# Patient Record
Sex: FEMALE | Race: OTHER | HISPANIC OR LATINO | Employment: OTHER | URBAN - METROPOLITAN AREA
[De-identification: names, ages, dates, MRNs, and addresses within clinical notes are randomized per-mention and may not be internally consistent; named-entity substitution may affect disease eponyms.]

---

## 2019-12-09 ENCOUNTER — OFFICE VISIT (OUTPATIENT)
Dept: OBGYN CLINIC | Facility: CLINIC | Age: 69
End: 2019-12-09

## 2019-12-09 VITALS
HEIGHT: 61 IN | BODY MASS INDEX: 22.47 KG/M2 | HEART RATE: 104 BPM | DIASTOLIC BLOOD PRESSURE: 96 MMHG | WEIGHT: 119 LBS | SYSTOLIC BLOOD PRESSURE: 164 MMHG

## 2019-12-09 DIAGNOSIS — R35.0 URINARY FREQUENCY: ICD-10-CM

## 2019-12-09 DIAGNOSIS — Z12.11 SCREEN FOR COLON CANCER: ICD-10-CM

## 2019-12-09 DIAGNOSIS — Z01.419 ENCOUNTER FOR GYNECOLOGICAL EXAMINATION WITHOUT ABNORMAL FINDING: Primary | ICD-10-CM

## 2019-12-09 DIAGNOSIS — Z12.31 ENCOUNTER FOR SCREENING MAMMOGRAM FOR MALIGNANT NEOPLASM OF BREAST: ICD-10-CM

## 2019-12-09 DIAGNOSIS — Z90.710 HISTORY OF TOTAL ABDOMINAL HYSTERECTOMY: ICD-10-CM

## 2019-12-09 DIAGNOSIS — E28.39 HYPOESTROGENISM: ICD-10-CM

## 2019-12-09 PROCEDURE — 99387 INIT PM E/M NEW PAT 65+ YRS: CPT | Performed by: NURSE PRACTITIONER

## 2019-12-09 PROCEDURE — 87086 URINE CULTURE/COLONY COUNT: CPT | Performed by: NURSE PRACTITIONER

## 2019-12-09 RX ORDER — DIPHENOXYLATE HYDROCHLORIDE AND ATROPINE SULFATE 2.5; .025 MG/1; MG/1
1 TABLET ORAL DAILY
COMMUNITY

## 2019-12-09 RX ORDER — ALBUTEROL SULFATE 90 UG/1
AEROSOL, METERED RESPIRATORY (INHALATION)
Refills: 0 | COMMUNITY
Start: 2019-10-13 | End: 2021-01-04 | Stop reason: ALTCHOICE

## 2019-12-09 NOTE — PROGRESS NOTES
Subjective      Patrecia Landau is a 68 y o  female who presents for annual well woman exam she reports she has not had a GYN exam for at least 20 years  Patient with history of fibroids and had a total abdominal hysterectomy done  Her hysterectomy was 30 years ago  She reports she was on estrogen therapy for about 7 years after her hysterectomy but stopped due to side effects  Her surgery was done [facility]   Denies history of abnormal Pap smears  patient reports urinary frequency that comes and goes  patient reports vaginal itching that comes and goes, no symptoms currently    GYN:  · No vaginal discharge, labial erythema or lesions, dyspareunia  · Contraception:  Tubal ligation  · Patient is not sexually active   Her  passed away last 32 years ago  ·  Last pap smear was in 20 years ago  History of hysterectomy, tubal ligation gynecologic surgeries  She denies any personal cancer history    OB:  · G 2 P 2 female  · Pregnancies were   :  · Dysuria no , sometimes has urinary frequency, has no urgency urgency  The patient denies symptoms today  · no hematuria, flank pain, incontinence  Breast:  · no breast mass, skin changes, dimpling, reddening, nipple retraction  · no breast discharge  · Last mammogram was in 20 years, done at [facility]   Results were negative per pt  · Patient does   have a family history of breast in her sister,  No endometrial, or ovarian ca  Another sister has some type of abdominal cancer     General:  · Diet: none  · Exercise: 3x per week  · Work: works at home  · ETOH use: no  · Tobacco use: no  · Recreational drug use: no    Screening:  · Cervical cancer: last pap smear in  20 years ago  Results were neg per pt  · Breast cancer: last mammogram in 20 years  Results were negative per pt  Colon cancer: last colonoscopy- has never had   Review of Systems  Pertinent items are noted in HPI        Objective      There were no vitals taken for this visit     Physical Exam   Constitutional: She appears well-nourished  Neck: Neck supple  No thyromegaly present  Cardiovascular: Normal rate, regular rhythm and normal heart sounds  Pulmonary/Chest: Effort normal and breath sounds normal    Abdominal: Soft  There is no tenderness  Skin: Skin is warm and dry  Psychiatric: She has a normal mood and affect  Her behavior is normal    appears younger than age       bilateral breast exam- no masses, negative nipple discharge, negative skin changes, no erythema   external genitalia- no erythema no lesions      Vagina- atrophic,  No discharge   cervix- surgically absent, cervical cuff  With no lesions   uterus- surgically absent     -Adnexa- surgically absent   rectal- no masses     Assessment      postmenopausal annual gyn exam   status post total abdominal hysterectomy    Urinary frequency  Plan    request for mammogram given the patient,  Reviewed self-breast exam   request for DEXA scan given the patient- reviewed calcium and vitamin-D and exercise for bone health   patient is never had a colonoscopy -referral given to patient to schedule screening mammogram   will call patient's results to her   clean-catch urine obtained

## 2019-12-10 LAB — BACTERIA UR CULT: NORMAL

## 2020-07-12 ENCOUNTER — APPOINTMENT (EMERGENCY)
Dept: CT IMAGING | Facility: HOSPITAL | Age: 70
End: 2020-07-12
Payer: OTHER GOVERNMENT

## 2020-07-12 ENCOUNTER — HOSPITAL ENCOUNTER (EMERGENCY)
Facility: HOSPITAL | Age: 70
Discharge: HOME | End: 2020-07-12
Attending: EMERGENCY MEDICINE | Admitting: EMERGENCY MEDICINE
Payer: OTHER GOVERNMENT

## 2020-07-12 VITALS
HEART RATE: 87 BPM | HEIGHT: 62 IN | WEIGHT: 118 LBS | BODY MASS INDEX: 21.71 KG/M2 | SYSTOLIC BLOOD PRESSURE: 163 MMHG | OXYGEN SATURATION: 96 % | TEMPERATURE: 98.1 F | DIASTOLIC BLOOD PRESSURE: 89 MMHG | RESPIRATION RATE: 18 BRPM

## 2020-07-12 DIAGNOSIS — R51.9 HEADACHE: Primary | ICD-10-CM

## 2020-07-12 DIAGNOSIS — J01.00 ACUTE NON-RECURRENT MAXILLARY SINUSITIS: ICD-10-CM

## 2020-07-12 DIAGNOSIS — I10 UNCONTROLLED HYPERTENSION: ICD-10-CM

## 2020-07-12 DIAGNOSIS — H65.90 SEROUS OTITIS MEDIA: ICD-10-CM

## 2020-07-12 LAB
ALBUMIN SERPL BCP-MCNC: 4.3 G/DL (ref 3.4–4.8)
ALP SERPL-CCNC: 102 U/L (ref 35–140)
ALT SERPL W P-5'-P-CCNC: 16 U/L (ref 5–54)
ANION GAP SERPL CALCULATED.3IONS-SCNC: 8 MMOL/L (ref 4–13)
APTT PPP: 28 SECONDS (ref 23–31)
AST SERPL W P-5'-P-CCNC: 20 U/L (ref 15–41)
BACTERIA UR QL AUTO: NORMAL /HPF
BASOPHILS # BLD AUTO: 0.03 THOUSANDS/ΜL (ref 0–0.1)
BASOPHILS NFR BLD AUTO: 0 % (ref 0–1)
BILIRUB SERPL-MCNC: 0.68 MG/DL (ref 0.3–1.2)
BILIRUB UR QL STRIP: NEGATIVE
BUN SERPL-MCNC: 19 MG/DL (ref 6–20)
CALCIUM SERPL-MCNC: 9.3 MG/DL (ref 8.4–10.2)
CHLORIDE SERPL-SCNC: 102 MMOL/L (ref 96–108)
CK SERPL-CCNC: 73.1 U/L (ref 38–234)
CLARITY UR: CLEAR
CO2 SERPL-SCNC: 29 MMOL/L (ref 22–33)
COLOR UR: YELLOW
CREAT SERPL-MCNC: 0.81 MG/DL (ref 0.4–1.1)
EOSINOPHIL # BLD AUTO: 0.17 THOUSAND/ΜL (ref 0–0.61)
EOSINOPHIL NFR BLD AUTO: 2 % (ref 0–6)
ERYTHROCYTE [DISTWIDTH] IN BLOOD BY AUTOMATED COUNT: 12.5 % (ref 11.6–15.1)
ERYTHROCYTE [SEDIMENTATION RATE] IN BLOOD: 22 MM/HOUR (ref 0–20)
GFR SERPL CREATININE-BSD FRML MDRD: 73 ML/MIN/1.73SQ M
GLUCOSE SERPL-MCNC: 107 MG/DL (ref 65–140)
GLUCOSE UR STRIP-MCNC: NEGATIVE MG/DL
HCT VFR BLD AUTO: 41.4 % (ref 34.8–46.1)
HGB BLD-MCNC: 14.1 G/DL (ref 11.5–15.4)
HGB UR QL STRIP.AUTO: ABNORMAL
IMM GRANULOCYTES # BLD AUTO: 0.02 THOUSAND/UL (ref 0–0.2)
IMM GRANULOCYTES NFR BLD AUTO: 0 % (ref 0–2)
INR PPP: 0.94 (ref 0.9–1.1)
KETONES UR STRIP-MCNC: NEGATIVE MG/DL
LEUKOCYTE ESTERASE UR QL STRIP: NEGATIVE
LYMPHOCYTES # BLD AUTO: 1.56 THOUSANDS/ΜL (ref 0.6–4.47)
LYMPHOCYTES NFR BLD AUTO: 19 % (ref 14–44)
MAGNESIUM SERPL-MCNC: 2.1 MG/DL (ref 1.6–2.6)
MCH RBC QN AUTO: 29.9 PG (ref 26.8–34.3)
MCHC RBC AUTO-ENTMCNC: 34.1 G/DL (ref 31.4–37.4)
MCV RBC AUTO: 88 FL (ref 82–98)
MONOCYTES # BLD AUTO: 0.59 THOUSAND/ΜL (ref 0.17–1.22)
MONOCYTES NFR BLD AUTO: 7 % (ref 4–12)
NEUTROPHILS # BLD AUTO: 5.67 THOUSANDS/ΜL (ref 1.85–7.62)
NEUTS SEG NFR BLD AUTO: 72 % (ref 43–75)
NITRITE UR QL STRIP: NEGATIVE
NON-SQ EPI CELLS URNS QL MICRO: NORMAL /HPF
PH UR STRIP.AUTO: 7 [PH]
PLATELET # BLD AUTO: 288 THOUSANDS/UL (ref 149–390)
PMV BLD AUTO: 11 FL (ref 8.9–12.7)
POTASSIUM SERPL-SCNC: 3.7 MMOL/L (ref 3.5–5)
PROT SERPL-MCNC: 7.5 G/DL (ref 6.4–8.3)
PROT UR STRIP-MCNC: NEGATIVE MG/DL
PROTHROMBIN TIME: 10 SECONDS (ref 9.5–12.1)
RBC # BLD AUTO: 4.72 MILLION/UL (ref 3.81–5.12)
RBC #/AREA URNS AUTO: NORMAL /HPF
SODIUM SERPL-SCNC: 139 MMOL/L (ref 133–145)
SP GR UR STRIP.AUTO: 1.01 (ref 1–1.03)
TROPONIN I SERPL-MCNC: <0.03 NG/ML (ref 0–0.07)
UROBILINOGEN UR QL STRIP.AUTO: 0.2 E.U./DL
WBC # BLD AUTO: 8.04 THOUSAND/UL (ref 4.31–10.16)
WBC #/AREA URNS AUTO: NORMAL /HPF

## 2020-07-12 PROCEDURE — 85652 RBC SED RATE AUTOMATED: CPT | Performed by: EMERGENCY MEDICINE

## 2020-07-12 PROCEDURE — 83735 ASSAY OF MAGNESIUM: CPT | Performed by: EMERGENCY MEDICINE

## 2020-07-12 PROCEDURE — 80053 COMPREHEN METABOLIC PANEL: CPT

## 2020-07-12 PROCEDURE — 99285 EMERGENCY DEPT VISIT HI MDM: CPT | Performed by: EMERGENCY MEDICINE

## 2020-07-12 PROCEDURE — 85025 COMPLETE CBC W/AUTO DIFF WBC: CPT

## 2020-07-12 PROCEDURE — 70450 CT HEAD/BRAIN W/O DYE: CPT

## 2020-07-12 PROCEDURE — 85730 THROMBOPLASTIN TIME PARTIAL: CPT | Performed by: EMERGENCY MEDICINE

## 2020-07-12 PROCEDURE — 96361 HYDRATE IV INFUSION ADD-ON: CPT

## 2020-07-12 PROCEDURE — 99284 EMERGENCY DEPT VISIT MOD MDM: CPT

## 2020-07-12 PROCEDURE — 36415 COLL VENOUS BLD VENIPUNCTURE: CPT

## 2020-07-12 PROCEDURE — 82550 ASSAY OF CK (CPK): CPT | Performed by: EMERGENCY MEDICINE

## 2020-07-12 PROCEDURE — 84484 ASSAY OF TROPONIN QUANT: CPT

## 2020-07-12 PROCEDURE — 96365 THER/PROPH/DIAG IV INF INIT: CPT

## 2020-07-12 PROCEDURE — 81001 URINALYSIS AUTO W/SCOPE: CPT | Performed by: EMERGENCY MEDICINE

## 2020-07-12 PROCEDURE — 96375 TX/PRO/DX INJ NEW DRUG ADDON: CPT

## 2020-07-12 PROCEDURE — 93005 ELECTROCARDIOGRAM TRACING: CPT

## 2020-07-12 PROCEDURE — 85610 PROTHROMBIN TIME: CPT | Performed by: EMERGENCY MEDICINE

## 2020-07-12 RX ORDER — AMOXICILLIN AND CLAVULANATE POTASSIUM 875; 125 MG/1; MG/1
1 TABLET, FILM COATED ORAL EVERY 12 HOURS
Qty: 20 TABLET | Refills: 0 | Status: SHIPPED | OUTPATIENT
Start: 2020-07-12 | End: 2020-07-22

## 2020-07-12 RX ORDER — DIPHENHYDRAMINE HYDROCHLORIDE 50 MG/ML
25 INJECTION INTRAMUSCULAR; INTRAVENOUS ONCE
Status: COMPLETED | OUTPATIENT
Start: 2020-07-12 | End: 2020-07-12

## 2020-07-12 RX ORDER — FLUTICASONE PROPIONATE 50 MCG
1 SPRAY, SUSPENSION (ML) NASAL DAILY
Qty: 16 G | Refills: 0 | Status: SHIPPED | OUTPATIENT
Start: 2020-07-12 | End: 2020-08-17 | Stop reason: SDUPTHER

## 2020-07-12 RX ORDER — MECLIZINE HYDROCHLORIDE 25 MG/1
25 TABLET ORAL 3 TIMES DAILY PRN
Qty: 30 TABLET | Refills: 0 | Status: SHIPPED | OUTPATIENT
Start: 2020-07-12 | End: 2021-01-04 | Stop reason: ALTCHOICE

## 2020-07-12 RX ORDER — SODIUM CHLORIDE 9 MG/ML
125 INJECTION, SOLUTION INTRAVENOUS CONTINUOUS
Status: DISCONTINUED | OUTPATIENT
Start: 2020-07-12 | End: 2020-07-12 | Stop reason: HOSPADM

## 2020-07-12 RX ORDER — TRAMADOL HYDROCHLORIDE 50 MG/1
50 TABLET ORAL EVERY 6 HOURS PRN
Qty: 20 TABLET | Refills: 0 | Status: SHIPPED | OUTPATIENT
Start: 2020-07-12 | End: 2020-11-24 | Stop reason: ALTCHOICE

## 2020-07-12 RX ORDER — CEFTRIAXONE 1 G/50ML
1000 INJECTION, SOLUTION INTRAVENOUS ONCE
Status: COMPLETED | OUTPATIENT
Start: 2020-07-12 | End: 2020-07-12

## 2020-07-12 RX ORDER — METOCLOPRAMIDE HYDROCHLORIDE 5 MG/ML
10 INJECTION INTRAMUSCULAR; INTRAVENOUS ONCE
Status: COMPLETED | OUTPATIENT
Start: 2020-07-12 | End: 2020-07-12

## 2020-07-12 RX ADMIN — CEFTRIAXONE 1000 MG: 1 INJECTION, SOLUTION INTRAVENOUS at 14:30

## 2020-07-12 RX ADMIN — METOCLOPRAMIDE 10 MG: 5 INJECTION, SOLUTION INTRAMUSCULAR; INTRAVENOUS at 14:26

## 2020-07-12 RX ADMIN — SODIUM CHLORIDE 125 ML/HR: 0.9 INJECTION, SOLUTION INTRAVENOUS at 13:45

## 2020-07-12 RX ADMIN — DIPHENHYDRAMINE HYDROCHLORIDE 25 MG: 50 INJECTION, SOLUTION INTRAMUSCULAR; INTRAVENOUS at 14:25

## 2020-07-12 NOTE — ED PROVIDER NOTES
History  Chief Complaint   Patient presents with    Headache     patient reports intermittent bilateral frontal headache with "spots" to RIGHT side of vision for a few days  reports dizziness with ambulation  hx HTn but does not take any medications  reports mild nausea     This is a 75-year-old female who appears younger than her stated age  Patient ambulated to the emergency department accompanied by her daughter  Patient states that she has had intermittent bilateral frontal headaches with intermittent spots in front of her eyes for several days  She states that she has had dizziness with ambulation for the past 2 days and is worsening  According to daughter she does have a history of hypertension has never been placed on medications  Her daughter states that "the blood pressure was good back to her normal "    Patient denies any any fall striking her head  Denies any vomiting or diarrhea  Denies any fever chills or any recent illnesses  Denies chest pain, tightness or shortness of breath          Prior to Admission Medications   Prescriptions Last Dose Informant Patient Reported? Taking? Calcium Carbonate-Vitamin D (CALCIUM 500/D PO)   Yes No   Sig: Take by mouth   albuterol (PROVENTIL HFA,VENTOLIN HFA) 90 mcg/act inhaler   Yes No   Sig: INHALE 2 PUFFS EVERY 6 HOURS AS NEEDED FOR WHEEZING   multivitamin (THERAGRAN) TABS   Yes No   Sig: Take 1 tablet by mouth daily      Facility-Administered Medications: None       Past Medical History:   Diagnosis Date    Fibroids     Hypertension        Past Surgical History:   Procedure Laterality Date    HYSTERECTOMY      TUBAL LIGATION      UTERINE FIBROID SURGERY         Family History   Problem Relation Age of Onset    Breast cancer Sister     Prostate cancer Brother     Colon cancer Brother     Cancer Sister      I have reviewed and agree with the history as documented      E-Cigarette/Vaping     E-Cigarette/Vaping Substances     Social History Tobacco Use    Smoking status: Never Smoker    Smokeless tobacco: Never Used   Substance Use Topics    Alcohol use: Not Currently    Drug use: Never       Review of Systems   Constitutional: Negative for chills and fever  HENT: Negative for rhinorrhea and sore throat  Eyes: Positive for photophobia  Negative for pain, redness and visual disturbance  Respiratory: Negative for cough and shortness of breath  Cardiovascular: Negative for chest pain and leg swelling  Gastrointestinal: Negative for abdominal pain, diarrhea, nausea and vomiting  Endocrine: Negative for cold intolerance, heat intolerance, polydipsia, polyphagia and polyuria  Genitourinary: Negative for difficulty urinating, dysuria, hematuria and urgency  Musculoskeletal: Negative for back pain and myalgias  Skin: Negative for rash  Neurological: Positive for light-headedness  Negative for dizziness, seizures, syncope, speech difficulty, weakness and headaches  Psychiatric/Behavioral: Negative for confusion  All other systems reviewed and are negative  Physical Exam  Physical Exam   Constitutional: She is oriented to person, place, and time  She appears well-developed and well-nourished  HENT:   Right Ear: Hearing normal  Tympanic membrane is bulging  A middle ear effusion is present  Left Ear: Hearing normal  Tympanic membrane is bulging  A middle ear effusion is present  Nose: Nose normal    Mouth/Throat: Oropharynx is clear and moist  No oropharyngeal exudate  Eyes: Pupils are equal, round, and reactive to light  Conjunctivae and EOM are normal  Right eye exhibits no discharge  Left eye exhibits no discharge  No scleral icterus  Neck: Normal range of motion  Neck supple  No JVD present  No tracheal deviation present  Cardiovascular: Normal rate, regular rhythm and normal heart sounds  No murmur heard  Pulmonary/Chest: Effort normal and breath sounds normal  No respiratory distress  She has no wheezes  She has no rales  Abdominal: Soft  Bowel sounds are normal  There is no tenderness  There is no guarding  Musculoskeletal: Normal range of motion  She exhibits no edema or tenderness  Neurological: She is alert and oriented to person, place, and time  No cranial nerve deficit or sensory deficit  She exhibits normal muscle tone  5/5 motor, nl sens   Skin: Skin is warm and dry  Psychiatric: She has a normal mood and affect  Her behavior is normal    Nursing note and vitals reviewed        Vital Signs  ED Triage Vitals   Temperature Pulse Respirations Blood Pressure SpO2   07/12/20 1309 07/12/20 1309 07/12/20 1309 07/12/20 1309 07/12/20 1309   98 1 °F (36 7 °C) 103 18 (!) 203/98 98 %      Temp Source Heart Rate Source Patient Position - Orthostatic VS BP Location FiO2 (%)   07/12/20 1309 07/12/20 1320 07/12/20 1309 07/12/20 1309 --   Tympanic Monitor Sitting Left arm       Pain Score       --                  Vitals:    07/12/20 1320 07/12/20 1338 07/12/20 1352 07/12/20 1457   BP: (!) 177/95 167/92 149/89 163/89   Pulse: 93 94 94 87   Patient Position - Orthostatic VS: Lying            Visual Acuity  Visual Acuity      Most Recent Value   L Pupil Size (mm)  3   R Pupil Size (mm)  3          ED Medications  Medications   cefTRIAXone (ROCEPHIN) IVPB (premix) 1,000 mg 50 mL (0 mg Intravenous Stopped 7/12/20 1501)   diphenhydrAMINE (BENADRYL) injection 25 mg (25 mg Intravenous Given 7/12/20 1425)   metoclopramide (REGLAN) injection 10 mg (10 mg Intravenous Given 7/12/20 1426)       Diagnostic Studies  Results Reviewed     Procedure Component Value Units Date/Time    Sedimentation rate, automated [[id number]]  (Abnormal) Collected:  07/12/20 1314    Lab Status:  Final result Specimen:  Blood from Arm, Right Updated:  07/12/20 1436     Sed Rate 22 mm/hour     Magnesium [[id number]]  (Normal) Collected:  07/12/20 1314    Lab Status:  Final result Specimen:  Blood from Arm, Right Updated:  07/12/20 1426     Magnesium 2 1 mg/dL     CK Total with Reflex CKMB [[id number]]  (Normal) Collected:  07/12/20 1314    Lab Status:  Final result Specimen:  Blood from Arm, Right Updated:  07/12/20 1426     Total CK 73 1 U/L     Urine Microscopic [[id number]]  (Normal) Collected:  07/12/20 1341    Lab Status:  Final result Specimen:  Urine, Clean Catch Updated:  07/12/20 1402     RBC, UA 0-5 /hpf      WBC, UA None Seen /hpf      Epithelial Cells None Seen /hpf      Bacteria, UA None Seen /hpf     APTT [[id number]]  (Normal) Collected:  07/12/20 1336    Lab Status:  Final result Specimen:  Blood from Arm, Right Updated:  07/12/20 1354     PTT 28 seconds     Protime-INR [[id number]]  (Normal) Collected:  07/12/20 1336    Lab Status:  Final result Specimen:  Blood from Arm, Right Updated:  07/12/20 1354     Protime 10 0 seconds      INR 0 94    Narrative:       INR Reference Ranges:  No Anticoagulant, Normal:           0 9-1 1  Standard Dose, Oral Anticoagulant:  2 0-3 0  High Dose, Oral Anticoagulant:      2 5-3 5    UA w Reflex to Microscopic w Reflex to Culture [[id number]]  (Abnormal) Collected:  07/12/20 1341    Lab Status:  Final result Specimen:  Urine, Clean Catch Updated:  07/12/20 1352     Color, UA Yellow     Clarity, UA Clear     Specific Gravity, UA 1 010     pH, UA 7 0     Leukocytes, UA Negative     Nitrite, UA Negative     Protein, UA Negative mg/dl      Glucose, UA Negative mg/dl      Ketones, UA Negative mg/dl      Urobilinogen, UA 0 2 E U /dl      Bilirubin, UA Negative     Blood, UA Trace-Intact    Comprehensive metabolic panel [[id number]] Collected:  07/12/20 1314    Lab Status:  Final result Specimen:  Blood from Arm, Right Updated:  07/12/20 1348     Sodium 139 mmol/L      Potassium 3 7 mmol/L      Chloride 102 mmol/L      CO2 29 mmol/L      ANION GAP 8 mmol/L      BUN 19 mg/dL      Creatinine 0 81 mg/dL      Glucose 107 mg/dL      Calcium 9 3 mg/dL      AST 20 U/L      ALT 16 U/L      Alkaline Phosphatase 102 0 U/L      Total Protein 7 5 g/dL      Albumin 4 3 g/dL      Total Bilirubin 0 68 mg/dL      eGFR 73 ml/min/1 73sq m     Narrative:       National Kidney Disease Foundation guidelines for Chronic Kidney Disease (CKD):     Stage 1 with normal or high GFR (GFR > 90 mL/min/1 73 square meters)    Stage 2 Mild CKD (GFR = 60-89 mL/min/1 73 square meters)    Stage 3A Moderate CKD (GFR = 45-59 mL/min/1 73 square meters)    Stage 3B Moderate CKD (GFR = 30-44 mL/min/1 73 square meters)    Stage 4 Severe CKD (GFR = 15-29 mL/min/1 73 square meters)    Stage 5 End Stage CKD (GFR <15 mL/min/1 73 square meters)  Note: GFR calculation is accurate only with a steady state creatinine    Troponin I [[id number]]  (Normal) Collected:  07/12/20 1314    Lab Status:  Final result Specimen:  Blood from Arm, Right Updated:  07/12/20 1348     Troponin I <0 03 ng/mL     CBC and differential [[id number]]  (Normal) Collected:  07/12/20 1314    Lab Status:  Final result Specimen:  Blood from Arm, Right Updated:  07/12/20 1323     WBC 8 04 Thousand/uL      RBC 4 72 Million/uL      Hemoglobin 14 1 g/dL      Hematocrit 41 4 %      MCV 88 fL      MCH 29 9 pg      MCHC 34 1 g/dL      RDW 12 5 %      MPV 11 0 fL      Platelets 155 Thousands/uL      Neutrophils Relative 72 %      Immat GRANS % 0 %      Lymphocytes Relative 19 %      Monocytes Relative 7 %      Eosinophils Relative 2 %      Basophils Relative 0 %      Neutrophils Absolute 5 67 Thousands/µL      Immature Grans Absolute 0 02 Thousand/uL      Lymphocytes Absolute 1 56 Thousands/µL      Monocytes Absolute 0 59 Thousand/µL      Eosinophils Absolute 0 17 Thousand/µL      Basophils Absolute 0 03 Thousands/µL                  CT head without contrast   ED Interpretation by Melissa Pickering MD (07/12 1905)   No acute findings      Final Result by Clem Marks MD (07/12 3993)      No acute intracranial abnormality                    Workstation performed: [id number] Procedures  Procedures         ED Course      this 61-year-old female presented with a headache that has been present for several days she also states that she has had some lightheadedness  Patient's blood pressure on arrival was 203/98  Headache is for hyper tension she was placed on medications  Patient's blood it went the 9/89  The medicated patient with Benadryl and right headache patient had bilateral the headache and dizziness  Patient and started on Augmentin for outpatient  And did the proper to obtain back for results    Patient's reviewed and were normal   No EKG changes  Troponin negative    Encouraged patient and her daughter to follow up with the PCP as she should requires medication to keep her blood pressure under control    They verbalize understanding of the significance of this being taking care of                                        MDM  Number of Diagnoses or Management Options  Acute non-recurrent maxillary sinusitis:   Headache: established and improving  Serous otitis media: established and improving  Uncontrolled hypertension: established and improving     Amount and/or Complexity of Data Reviewed  Clinical lab tests: ordered and reviewed  Tests in the radiology section of CPT®: ordered and reviewed  Obtain history from someone other than the patient: yes (daughter)  Review and summarize past medical records: yes  Independent visualization of images, tracings, or specimens: yes    Patient Progress  Patient progress: improved        Disposition  Final diagnoses:   Headache   Uncontrolled hypertension   Serous otitis media   Acute non-recurrent maxillary sinusitis     Time reflects when diagnosis was documented in both MDM as applicable and the Disposition within this note     Time User Action Codes Description Comment    7/12/2020  2:15 PM Latonia Goss Add [R51] Headache     7/12/2020  2:15 PM Latonia Goss Add [I10] Uncontrolled hypertension     7/12/2020  2:16 PM José Miguel Peoples Add [G10 40] Serous otitis media     7/12/2020  2:16 PM José Miguel Peoples Add [J01 00] Acute non-recurrent maxillary sinusitis       ED Disposition     ED Disposition Condition Date/Time Comment    Discharge Stable Sun Jul 12, 2020  2:13 PM Terrence Hernandez discharge to home/self care              Follow-up Information    None         Discharge Medication List as of 7/12/2020  2:53 PM      START taking these medications    Details   amoxicillin-clavulanate (AUGMENTIN) 875-125 mg per tablet Take 1 tablet by mouth every 12 (twelve) hours for 10 days, Starting Sun 7/12/2020, Until Wed 7/22/2020, Normal      fluticasone (FLONASE) 50 mcg/act nasal spray 1 spray into each nostril daily, Starting Sun 7/12/2020, Normal      meclizine (ANTIVERT) 25 mg tablet Take 1 tablet (25 mg total) by mouth 3 (three) times a day as needed for dizziness, Starting Sun 7/12/2020, Normal      traMADol (ULTRAM) 50 mg tablet Take 1 tablet (50 mg total) by mouth every 6 (six) hours as needed for moderate pain for up to 20 doses, Starting Sun 7/12/2020, Normal         CONTINUE these medications which have NOT CHANGED    Details   albuterol (PROVENTIL HFA,VENTOLIN HFA) 90 mcg/act inhaler INHALE 2 PUFFS EVERY 6 HOURS AS NEEDED FOR WHEEZING, Historical Med      Calcium Carbonate-Vitamin D (CALCIUM 500/D PO) Take by mouth, Historical Med      multivitamin (THERAGRAN) TABS Take 1 tablet by mouth daily, Historical Med               PDMP Review     None          ED Provider  Electronically Signed by           Myrna Lemus MD  07/16/20 3667

## 2020-07-12 NOTE — DISCHARGE INSTRUCTIONS
Please follow up with a Family Physician asap - let them know that you were here today and they will be able to look at your chart and your blood pressure  Use the nasal steroid twice daily  (each nostril)

## 2020-07-12 NOTE — ED PROCEDURE NOTE
PROCEDURE  ECG 12 Lead Documentation Only  Date/Time: 7/12/2020 1:17 PM  Performed by: Shira Lui MD  Authorized by:  Shira Lui MD     Indications / Diagnosis:  Hypertensive urgency  ECG reviewed by me, the ED Provider: yes    Patient location:  ED and bedside  Previous ECG:     Previous ECG:  Unavailable    Comparison to cardiac monitor: Yes    Interpretation:     Interpretation: normal    Rate:     ECG rate:  98    ECG rate assessment: normal    Rhythm:     Rhythm: sinus rhythm    Ectopy:     Ectopy: none    QRS:     QRS axis:  Normal  Conduction:     Conduction: normal    ST segments:     ST segments:  Normal  T waves:     T waves: normal    Comments:      No acute ischemia or infarction         Shira Lui MD  07/12/20 1347

## 2020-07-14 LAB
ATRIAL RATE: 98 BPM
P AXIS: 45 DEGREES
PR INTERVAL: 117 MS
QRS AXIS: 65 DEGREES
QRSD INTERVAL: 93 MS
QT INTERVAL: 365 MS
QTC INTERVAL: 467 MS
T WAVE AXIS: 50 DEGREES
VENTRICULAR RATE: 98 BPM

## 2020-07-14 PROCEDURE — 93010 ELECTROCARDIOGRAM REPORT: CPT | Performed by: INTERNAL MEDICINE

## 2020-07-22 ENCOUNTER — OFFICE VISIT (OUTPATIENT)
Dept: FAMILY MEDICINE CLINIC | Facility: CLINIC | Age: 70
End: 2020-07-22
Payer: OTHER GOVERNMENT

## 2020-07-22 VITALS
DIASTOLIC BLOOD PRESSURE: 90 MMHG | SYSTOLIC BLOOD PRESSURE: 160 MMHG | TEMPERATURE: 99.2 F | WEIGHT: 119.2 LBS | BODY MASS INDEX: 21.94 KG/M2 | HEART RATE: 101 BPM | HEIGHT: 62 IN | RESPIRATION RATE: 18 BRPM | OXYGEN SATURATION: 98 %

## 2020-07-22 DIAGNOSIS — I10 UNCONTROLLED HYPERTENSION: Primary | ICD-10-CM

## 2020-07-22 DIAGNOSIS — H65.93 BILATERAL SEROUS OTITIS MEDIA, UNSPECIFIED CHRONICITY: ICD-10-CM

## 2020-07-22 DIAGNOSIS — R70.0 ESR RAISED: ICD-10-CM

## 2020-07-22 PROCEDURE — 1036F TOBACCO NON-USER: CPT | Performed by: FAMILY MEDICINE

## 2020-07-22 PROCEDURE — 99203 OFFICE O/P NEW LOW 30 MIN: CPT | Performed by: FAMILY MEDICINE

## 2020-07-22 PROCEDURE — 1160F RVW MEDS BY RX/DR IN RCRD: CPT | Performed by: FAMILY MEDICINE

## 2020-07-22 PROCEDURE — 3008F BODY MASS INDEX DOCD: CPT | Performed by: FAMILY MEDICINE

## 2020-07-22 RX ORDER — LISINOPRIL 20 MG/1
20 TABLET ORAL DAILY
Qty: 1 TABLET | Refills: 1 | Status: SHIPPED | OUTPATIENT
Start: 2020-07-22 | End: 2020-08-03 | Stop reason: ALTCHOICE

## 2020-07-22 NOTE — ASSESSMENT & PLAN NOTE
Assessment:  The patient appears to have uncontrolled hypertension as her primary health issue today  Plan: The patient was prescribed lisinopril 20 mg q d and will follow-up in 7 days or beforehand for any new or additional symptoms

## 2020-07-22 NOTE — PROGRESS NOTES
Assessment/Plan:    Uncontrolled hypertension  Assessment:  The patient appears to have uncontrolled hypertension as her primary health issue today  Plan: The patient was prescribed lisinopril 20 mg q d and will follow-up in 7 days or beforehand for any new or additional symptoms  Serous otitis media  Assessment:  Serous otitis media  Plan: Will reevaluate on her next office visit  Diagnoses and all orders for this visit:    Uncontrolled hypertension    Bilateral serous otitis media, unspecified chronicity        Subjective:      Patient ID: Shoaib Cannon is a 65 y o  female  Patient presents with:  Establish Care: patient c/o over heating frequently        Hypertension   Associated symptoms include headaches  Pertinent negatives include no chest pain or shortness of breath  José Luis Jacob is a very pleasant 49-year-old female who presents today following a recent emergency room admission for headaches and lightheadedness  A detailed history today was provided with the assistance of remote interpretation and confirmed twice indicating that the patient's headaches have resolved, and she is not experiencing any of the related symptoms she experienced on her most recent admission to the emergency room  Specifically, she denies headache, dizziness loss of balance, and  ringing in the ears  She was advised by emergency room personnel to obtain a primary care visit hence the primary reason for her presentation today    Review of Systems   Constitutional: Positive for diaphoresis  Negative for appetite change, chills and fever  HENT: Negative for facial swelling and hearing loss  Respiratory: Negative for apnea, chest tightness and shortness of breath  Cardiovascular: Negative for chest pain  Gastrointestinal: Positive for abdominal pain  Endocrine: Positive for heat intolerance  Skin: Positive for color change and rash  Neurological: Positive for dizziness, syncope and headaches  Psychiatric/Behavioral: Positive for agitation, behavioral problems and confusion  Describes periods of sadness "depression", much more so during menopause, as best as can be determined self resolving  Objective:      /90 (BP Location: Right arm, Patient Position: Sitting, Cuff Size: Adult)   Pulse 101   Temp 99 2 °F (37 3 °C) (Tympanic)   Resp 18   Ht 5' 2" (1 575 m)   Wt 54 1 kg (119 lb 3 2 oz)   SpO2 98%   BMI 21 80 kg/m²          Physical Exam   Constitutional: She is oriented to person, place, and time  She appears well-developed and well-nourished  HENT:   Head: Normocephalic and atraumatic  Head is without right periorbital erythema and without left periorbital erythema  Serous OM, much worse on the left  There is no tenderness of the temporal facial areas no or nor other any so masses or soft tissue changes palpable or visible  Eyes: EOM are normal    Cardiovascular: Normal rate and regular rhythm  Murmur heard  Pulmonary/Chest: Breath sounds normal  She is in respiratory distress  Abdominal: Soft  Bowel sounds are normal    Neurological: She is alert and oriented to person, place, and time  Skin: Skin is dry  Psychiatric: She has a normal mood and affect   Her behavior is normal  Judgment normal

## 2020-08-03 ENCOUNTER — OFFICE VISIT (OUTPATIENT)
Dept: FAMILY MEDICINE CLINIC | Facility: CLINIC | Age: 70
End: 2020-08-03
Payer: OTHER GOVERNMENT

## 2020-08-03 VITALS
HEART RATE: 94 BPM | HEIGHT: 62 IN | DIASTOLIC BLOOD PRESSURE: 80 MMHG | RESPIRATION RATE: 18 BRPM | OXYGEN SATURATION: 99 % | WEIGHT: 118 LBS | BODY MASS INDEX: 21.71 KG/M2 | SYSTOLIC BLOOD PRESSURE: 130 MMHG | TEMPERATURE: 98.7 F

## 2020-08-03 DIAGNOSIS — J30.2 SEASONAL ALLERGIC RHINITIS, UNSPECIFIED TRIGGER: ICD-10-CM

## 2020-08-03 DIAGNOSIS — I10 HYPERTENSION, UNSPECIFIED TYPE: Primary | ICD-10-CM

## 2020-08-03 DIAGNOSIS — K21.9 GASTROESOPHAGEAL REFLUX DISEASE, ESOPHAGITIS PRESENCE NOT SPECIFIED: ICD-10-CM

## 2020-08-03 PROCEDURE — 1160F RVW MEDS BY RX/DR IN RCRD: CPT | Performed by: FAMILY MEDICINE

## 2020-08-03 PROCEDURE — 3008F BODY MASS INDEX DOCD: CPT | Performed by: FAMILY MEDICINE

## 2020-08-03 PROCEDURE — 99214 OFFICE O/P EST MOD 30 MIN: CPT | Performed by: FAMILY MEDICINE

## 2020-08-03 PROCEDURE — 1036F TOBACCO NON-USER: CPT | Performed by: FAMILY MEDICINE

## 2020-08-03 RX ORDER — OMEPRAZOLE 20 MG/1
20 CAPSULE, DELAYED RELEASE ORAL DAILY
Qty: 60 CAPSULE | Refills: 0 | Status: SHIPPED | OUTPATIENT
Start: 2020-08-03

## 2020-08-03 RX ORDER — AMLODIPINE BESYLATE 2.5 MG/1
2.5 TABLET ORAL DAILY
Qty: 90 TABLET | Refills: 0 | Status: SHIPPED | OUTPATIENT
Start: 2020-08-03 | End: 2020-11-12 | Stop reason: SDUPTHER

## 2020-08-03 NOTE — ASSESSMENT & PLAN NOTE
-Patient having GERD symptoms, worse at night  -Patient previously tried tiered treatment of GERD symptoms:   -OTC antacid (TUMS, Rolaids, Maalox)   -OTC H2 blocker (ranitidine, famotidine), used BID  -Discussed with patient her indications for PPI therapy:   -Severe symptoms of GERD that impair quality of life  -Accordingly, will initiate 8-week trial of PPI therapy at this time  -Advised on taking PPI 30-60 mins before breakfast for maximal absorption  -Encouraged QD administration (not PRN) for better symptom control  -Discussed Anti-reflux measures:    -Raise the head of the bed 4 to 6 inches   -Avoid excess coffee, tea or other caffeinated beverages   -Avoid spicy or acidic foods, eating before bed   -Avoid garments that fit tightly through the abdomen

## 2020-08-03 NOTE — PROGRESS NOTES
Assessment/Plan:     Problem List Items Addressed This Visit        Digestive    Gastroesophageal reflux disease     -Patient having GERD symptoms, worse at night  -Patient previously tried tiered treatment of GERD symptoms:   -OTC antacid (TUMS, Rolaids, Maalox)   -OTC H2 blocker (ranitidine, famotidine), used BID  -Discussed with patient her indications for PPI therapy:   -Severe symptoms of GERD that impair quality of life  -Accordingly, will initiate 8-week trial of PPI therapy at this time  -Advised on taking PPI 30-60 mins before breakfast for maximal absorption  -Encouraged QD administration (not PRN) for better symptom control  -Discussed Anti-reflux measures:    -Raise the head of the bed 4 to 6 inches   -Avoid excess coffee, tea or other caffeinated beverages   -Avoid spicy or acidic foods, eating before bed   -Avoid garments that fit tightly through the abdomen           Relevant Medications    omeprazole (PriLOSEC) 20 mg delayed release capsule       Respiratory    Seasonal allergic rhinitis     -Sterile effusions behind B/L TMs, no evidence of infection at this time  -Suspect patient having some post-nasal drip contributing to throat symptoms  -Advised patient to start flonase and claritin daily to see if congestion symptoms improve            Cardiovascular and Mediastinum    Hypertension - Primary     -HTN now well-controlled and at goal  -Unfortunately, patient reports intolerance of ACEI with frequent throat clearing and tickle sensation since starting the medication  -Difficult to determine whether throat symptoms are truly related to ACEI, or just GERD, post-nasal drip, etc   -Per patient request, will change ACEI to CCB at this time  -Due to patient's age, will start low-dose amlodipine 2 5 mg QD and have patient return in 2 weeks, will likely need dose increase at that time  -Record home BP readings and bring log book to next visit for review  -DASH diet handout given today  -Advised patient on symptoms of hypotension & severe HTN  -Limit salt-intake & caffeine in diet, minimize stress level  -Weight reduction efforts via improved diet & increased exercise             Relevant Medications    amLODIPine (NORVASC) 2 5 mg tablet            Subjective:      Patient ID: Dina Fulton is a 65 y o  female  HPI    Patient seen 7/22/20 for HTN and started on lisinopril 20 mg QD at that visit  She has been checking her BP at home and it has been consistently <140/90, but she is complaining now of a dry cough and tickling in her throat sensation that is very bothersome to her  She would like to try a different medication for her HTN  Also found to have serous otitis media B/L at that visit; was given abx in ED 7/12/20  Uses claritin and flonase but only sporadically  Does have some acid reflux, especially when drinking citrus juices and coffee; uses Tums but it doesn't help much  Not on any PPI  The following portions of the patient's history were reviewed and updated as appropriate: allergies, current medications, past family history, past medical history, past social history, past surgical history and problem list     Review of Systems   Constitutional: Negative for chills and fever  HENT: Positive for congestion and postnasal drip  Negative for trouble swallowing  "Tickle in throat" as noted in HPI   Respiratory: Negative for chest tightness and shortness of breath  Cardiovascular: Negative for chest pain, palpitations and leg swelling  Gastrointestinal: Negative for abdominal pain, diarrhea, nausea and vomiting  Genitourinary: Negative for dysuria  Musculoskeletal: Negative for gait problem  Skin: Negative for rash  Neurological: Positive for dizziness  Negative for syncope, weakness and light-headedness  Still having intermittent dizziness when moving around suddenly   Psychiatric/Behavioral: Negative for agitation, sleep disturbance and suicidal ideas     All other systems reviewed and are negative  Objective:      /80 (BP Location: Left arm, Patient Position: Sitting, Cuff Size: Adult)   Pulse 94   Temp 98 7 °F (37 1 °C) (Tympanic)   Resp 18   Ht 5' 2"   Wt 53 5 kg (118 lb)   SpO2 99%   BMI 21 58 kg/m²          Physical Exam   Constitutional:   Clear-colored effusions behind B/L TMs   HENT:   Head: Normocephalic and atraumatic  Right Ear: External ear normal    Left Ear: External ear normal    Nose: Nose normal    Mouth/Throat: Mucous membranes are moist    Eyes: Conjunctivae are normal    Neck: Normal range of motion  Neck supple  Cardiovascular: Normal rate, regular rhythm and normal pulses  Pulmonary/Chest: Effort normal and breath sounds normal    Abdominal: Soft  Normal appearance and bowel sounds are normal  She exhibits no distension  There is no abdominal tenderness  Musculoskeletal: Normal range of motion  Neurological: She is alert  Skin: Skin is warm and dry  Capillary refill takes less than 2 seconds  Psychiatric: Her behavior is normal  Mood normal    Vitals reviewed

## 2020-08-03 NOTE — ASSESSMENT & PLAN NOTE
-Sterile effusions behind B/L TMs, no evidence of infection at this time  -Suspect patient having some post-nasal drip contributing to throat symptoms  -Advised patient to start flonase and claritin daily to see if congestion symptoms improve

## 2020-08-03 NOTE — ASSESSMENT & PLAN NOTE
-HTN now well-controlled and at goal  -Unfortunately, patient reports intolerance of ACEI with frequent throat clearing and tickle sensation since starting the medication  -Difficult to determine whether throat symptoms are truly related to ACEI, or just GERD, post-nasal drip, etc   -Per patient request, will change ACEI to CCB at this time  -Due to patient's age, will start low-dose amlodipine 2 5 mg QD and have patient return in 2 weeks, will likely need dose increase at that time  -Record home BP readings and bring log book to next visit for review  -DASH diet handout given today  -Advised patient on symptoms of hypotension & severe HTN  -Limit salt-intake & caffeine in diet, minimize stress level  -Weight reduction efforts via improved diet & increased exercise

## 2020-08-17 ENCOUNTER — OFFICE VISIT (OUTPATIENT)
Dept: FAMILY MEDICINE CLINIC | Facility: CLINIC | Age: 70
End: 2020-08-17
Payer: OTHER GOVERNMENT

## 2020-08-17 VITALS
HEART RATE: 102 BPM | TEMPERATURE: 98.2 F | SYSTOLIC BLOOD PRESSURE: 128 MMHG | RESPIRATION RATE: 18 BRPM | HEIGHT: 62 IN | OXYGEN SATURATION: 97 % | DIASTOLIC BLOOD PRESSURE: 80 MMHG | BODY MASS INDEX: 21.64 KG/M2 | WEIGHT: 117.6 LBS

## 2020-08-17 DIAGNOSIS — I10 ESSENTIAL HYPERTENSION: Primary | ICD-10-CM

## 2020-08-17 DIAGNOSIS — J30.2 SEASONAL ALLERGIC RHINITIS, UNSPECIFIED TRIGGER: ICD-10-CM

## 2020-08-17 PROBLEM — J30.9 ALLERGIC RHINITIS: Status: ACTIVE | Noted: 2020-07-12

## 2020-08-17 PROCEDURE — 99213 OFFICE O/P EST LOW 20 MIN: CPT | Performed by: FAMILY MEDICINE

## 2020-08-17 PROCEDURE — 1160F RVW MEDS BY RX/DR IN RCRD: CPT | Performed by: FAMILY MEDICINE

## 2020-08-17 PROCEDURE — 1036F TOBACCO NON-USER: CPT | Performed by: FAMILY MEDICINE

## 2020-08-17 PROCEDURE — 3008F BODY MASS INDEX DOCD: CPT | Performed by: FAMILY MEDICINE

## 2020-08-17 RX ORDER — FLUTICASONE PROPIONATE 50 MCG
1 SPRAY, SUSPENSION (ML) NASAL DAILY
Qty: 16 G | Refills: 3 | Status: SHIPPED | OUTPATIENT
Start: 2020-08-17 | End: 2020-11-23 | Stop reason: SDUPTHER

## 2020-08-17 NOTE — PROGRESS NOTES
Family Medicine Follow-Up Office Visit  Avila Diego 65 y o  female   MRN: [id number] : 1950  ENCOUNTER: 2020 9:28 AM    Assessment and Plan     Problem List Items Addressed This Visit        Respiratory    Allergic rhinitis     -Symptoms of post-nasal drip improving with flonase and claritin  -Serous otitis improved but still some clear effusions behind TMs  -Continue current regimen, reassess at next visit         Relevant Medications    fluticasone (FLONASE) 50 mcg/act nasal spray       Cardiovascular and Mediastinum    Hypertension - Primary     -HTN very well-controlled on amlodipine 2 5 mg after patient unable to tolerate ACEI  -Denies side effects, no pedal edema on exam  -BP log revealed BP 110s-130s/70s-80s, today's /80, at goal  -Continue current medication, no refill needed today  -Discussed patient's BP goal in detail with her today  -Advised patient on symptoms of hypotension & severe HTN  -Limit salt-intake & caffeine in diet, minimize stress level  -DASH diet handout given via AVS (in Sami)   -Discussed importance of treating HTN to prevent ASCVD, CVA, CKD/ESRD  -Follow up in 3 months for re-check, refill of amlodipine  -Will check fasting CMP, lipid panel, TSH at next visit                 Chief Complaint     Chief Complaint   Patient presents with    Follow-up       History of Present Illness   Avila Diego is a 65y o -year-old female who presents today for follow up on HTN; BP was well-controlled at last visit 8/3/20 but she was unable to tolerated ACEI due to cough and patient requested to try a different type of medication; was started on amlodipine 2 5 mg QD and told to monitor and record BP for review at today's visit  She has brought her BP log (shown below), which shows her BP has been controlled very well, 110s-130s/70s-80s  BP today 128/80      Also started on Prilosec for suspected GERD, and flonase/claritin for post-nasal drip, as both GERD & post-nasal drip may have been contributing to cough/tickle-in-throat sensation  Feels both medication regimens have been helping  Review of Systems   Review of Systems   Constitutional: Negative for chills and fever  HENT: Positive for postnasal drip  Eyes: Negative for visual disturbance  Respiratory: Negative for chest tightness and shortness of breath  Cardiovascular: Negative for chest pain, palpitations and leg swelling  Gastrointestinal: Negative for abdominal pain, diarrhea, nausea and vomiting  Genitourinary: Negative for dysuria  Musculoskeletal:        Denies LE edema   Neurological: Negative for syncope, weakness and light-headedness  All other systems reviewed and are negative  Active Problem List     Patient Active Problem List   Diagnosis    Urinary frequency    Hypoestrogenism    Screen for colon cancer    Encounter for screening mammogram for malignant neoplasm of breast    History of total abdominal hysterectomy    Encounter for gynecological examination without abnormal finding    Headache    Hypertension    Serous otitis media    Acute non-recurrent maxillary sinusitis    ESR raised    Gastroesophageal reflux disease    Seasonal allergic rhinitis       Past Medical History, Past Surgical History, Family History, and Social History were reviewed and updated today as appropriate  Objective   /80 (BP Location: Right arm, Patient Position: Sitting, Cuff Size: Adult)   Pulse 102   Temp 98 2 °F (36 8 °C) (Tympanic)   Resp 18   Ht 5' 2" (1 575 m)   Wt 53 3 kg (117 lb 9 6 oz)   SpO2 97%   BMI 21 51 kg/m²     Physical Exam  Vitals signs reviewed  Constitutional:       General: She is not in acute distress  Appearance: Normal appearance  HENT:      Head: Normocephalic and atraumatic  Ears:      Comments: Very mild clear effusions B/L, L>R  Eyes:      General:         Right eye: No discharge  Left eye: No discharge     Neck:      Musculoskeletal: Normal range of motion  Cardiovascular:      Rate and Rhythm: Normal rate and regular rhythm  Pulmonary:      Effort: Pulmonary effort is normal       Breath sounds: Normal breath sounds  Abdominal:      General: Abdomen is flat  Bowel sounds are normal       Palpations: Abdomen is soft  Tenderness: There is no abdominal tenderness  Musculoskeletal: Normal range of motion  Skin:     Capillary Refill: Capillary refill takes less than 2 seconds  Neurological:      Mental Status: She is alert  Psychiatric:         Mood and Affect: Mood normal          Behavior: Behavior normal          Thought Content: Thought content normal          Judgment: Judgment normal          Current Medications     Current Outpatient Medications   Medication Sig Dispense Refill    albuterol (PROVENTIL HFA,VENTOLIN HFA) 90 mcg/act inhaler INHALE 2 PUFFS EVERY 6 HOURS AS NEEDED FOR WHEEZING  0    amLODIPine (NORVASC) 2 5 mg tablet Take 1 tablet (2 5 mg total) by mouth daily 90 tablet 0    Calcium Carbonate-Vitamin D (CALCIUM 500/D PO) Take by mouth      fluticasone (FLONASE) 50 mcg/act nasal spray 1 spray into each nostril daily 16 g 0    meclizine (ANTIVERT) 25 mg tablet Take 1 tablet (25 mg total) by mouth 3 (three) times a day as needed for dizziness 30 tablet 0    multivitamin (THERAGRAN) TABS Take 1 tablet by mouth daily      omeprazole (PriLOSEC) 20 mg delayed release capsule Take 1 capsule (20 mg total) by mouth daily 60 capsule 0    traMADol (ULTRAM) 50 mg tablet Take 1 tablet (50 mg total) by mouth every 6 (six) hours as needed for moderate pain for up to 20 doses 20 tablet 0     No current facility-administered medications for this visit          ALLERGIES:  No Known Allergies      Ryne Miles MD   [address]  8/17/2020  1:23 PM

## 2020-08-17 NOTE — PATIENT INSTRUCTIONS
Plan de alimentación con "enfoque dietético para detener la hipertensión (DASH, por jimmy siglas en inglés)   CUIDADO AMBULATORIO:   El plan de alimentación DASH (enfoques dietéticos para detener la hipertensión)  está diseñado para ayudar a prevenir o disminuir la presión arterial fan  También puede ayudar a bajar el colesterol samuel (colesterol LDL) y disminuír tucker riesgo de enfermedad cardíaca  El plan es bajo en sodio, azúcar, grasas dañinas, y grasas en tucker totalidad  Es alto en potasio, calcio, magnesio y [place]  Estos nutrientes se agregan al consumir más frutas, vegetales y granos enteros  Tucker límite de sodio cada día: Tucker dietista le indicará la cantidad de sodio que usted debe consumir a diario  La gente que tiene la presión arterial fan debe consumir de 1,500 a 2,300 mg de sodio al día pelon kaylee  Law cucharadita (cdta) de sal tiene 2,300 mg de sodio  [place] puede parecer pelon law meta difícil, alf pequeños cambios en los alimentos que usted consume pueden hacer law gran diferencia  Tucker médico o dietista puede ayudarlo a crear un plan alimenticio que cumpla tucker límite de sodio  Formas de limitar el sodio:   · Neli las etiquetas de los alimentos  Las etiquetas pueden ayudarle a escoger alimentos bajos en sodio  La cantidad de sodio está incluida en miligramos (mg)  La columna del porcentaje de valor diario indica la cantidad de necesidades diarias satisfechas con 1 porción del alimento para cada nutriente en la lista  Escoja alimentos que tengan menos de 5% del porcentaje diario de [place]  Estos alimentos se consideran bajos en sodio  Los alimentos que tienen 20% o más del porcentaje diario de sodio se consideran alimentos altos en sodio  Evite alimentos que tengan más de 300 mg de sodio por porción  Escoja alimentos Kadie Panda diga que son bajos en sodio, con sodio reducido, o sin sal agregada             · Evite la sal   No le agregue sal a la comida cuando se siente a la ashley a comer, y agregue muy poca sal a los alimentos alonzo la cocción  Use hierbas y condimentos, pelon cebollas, ajo y especias sin sal para agregar sabor a los alimentos  Use jugo de lima, georges o vinagre para darle a los alimentos un sabor ácido  Use chiles picantes o law cantidad pequeña de salsa picante para agregar un sabor picante a los alimentos  · Pregunte sobre los sustitutos para la sal   Pregúntele a tucker médico si es posible usar sustitutos de la sal  Algunos sustitutos de la sal vienen con ingredientes que pueden ser dañinos si usted tiene ciertos padecimientos médicos  · Escoja los alimentos cuidadosamente cuando sale a comer a restaurantes:  las comidas de los restaurantes, sobre todo restaurantes de comida rápida, merly siempre son altas en sodio  Algunos restaurantes ofrecen información nutricional que indica la cantidad de sodio en jimmy alimentos  Pida que preparen jimmy comidas con menos sal o sin sal   Lo que necesita saber acerca de las grasas:   · Incluya grasas saludables  Las grasas insaturadas y los ácidos grasos omega-3 son ejemplos de grasas saludables  Las grasas insaturadas se encuentran en los aceites de soja, [place], [place] o [place] y la margarina [facility] y [facility]  Los ácidos grasos [place] 3 se encuentran en el pescado con grasa, pelon el salmón, el atún, la caballa y las darren  También se le puede encontrar en el aceita de linaza y en la linaza molida  · Evite las grasas insalubres  No consuma grasas dañinas, pelon grasas saturadas y grasas trans  Las grasas saturadas se encuentran en alimentos que contienen grasa animal  Maurita Chante son [facility] grasosas, la [place], la [place], la crema y otros productos lácteos  Además se pueden encontrar en la [place], la margarina en vandana, el aceite de syed y el aceite de rian  Las grasas trans se encuentran en comidas fritas, galletas estilo soda, tortillitas tostadas, y alimentos horneados hechos con Darrel Salmon    Alimentos que puede incluir:  Con el plan de alimentación DASH, usted necesita consumir un número específico de porciones de cada sudheer de alimentos  [place] le ayudará a consumir las cantidades suficientes de ciertos nutrientes y limitar otros  La cantidad de porciones que usted debe comer depende de la cantidad de calorías que usted necesita  [place] dietista le informará sobre cuántas calorías necesita usted  El número de porciones que viene en la lista al lado de los grupos alimenticios a continuación es para las personas que necesitan aproximadamente 2,000 calorías al día    · Granos:  6 a 8 porciones (3 de estas porciones deben ser de alimentos de granos enteros o integrales)    ¨ 1 tajada de pan integral     ¨ 1 onza de cereal seco    ¨ ½ taza de cereal cocinado, pasta, o arroz integral    · Verduras y frutas:  4 a 5 porciones de frutas y 4 a 5 porciones de vegetales    ¨ 1 fruta mediana    ¨ ½ taza de vegetales congelados, enlatados (sin sal adicional), o vegetales frescos y picados     ¨ ½ taza de fruta fresca, congelada, seca o enlatada (enlatada en sirope liviano o jugo de fruta)    ¨ ½ taza de jugo de verduras o frutas    · Productos lácteos:  2 a 3 porciones    ¨ 1 taza de [employer] o leche 1%    ¨ 1½ onzas de queso descremado o bajo en grasas y bajo en sodio    ¨ 6 onzas de yogurt descremado o bajo en grasas    · Alberto morejon, alberto ta y pescado magros:  6 onzas o menos    ¨ Alberto ta (carmella, pavo) sin piel    ¨ Pescado (sobre todo pescado con grasa, pelon salmón, atún fresco o SIDDIQUI)    ¨ Carne de res o de cerdo magra (lomo, carne molida extra [place])    ¨ Claras de huevo y sustitutos del huevo    · [place] du sac, semillas y legumbres:  4 a 5 porciones por semana    ¨ ½ taza de frijoles y arbejas cocinadas    ¨ 1½ onzas de nueces sin sal    ¨ 2 cucharadas de mantequilla de maní o semillas    · Dulces y azúcares adicionales:  5 o menos por semana    ¨ 1 cucharada de azúcar, jalea o mermelada    ¨ ½ taza de sorbeto o gelatina    ¨ 1 taza de limonada    · Grasas:  2 a 3 porciones por semana    ¨ 1 cucharadita de margarina suave o aceite vegetal    ¨ 1 cucharada de [facility]    ¨ 2 cucharadas de aderezo para ensaladas  Alimentos que se deben evitar:   · Granos:      ¨ Postres horneados o fritos pelon donas, pastelitos, galletas, y quequitos (altos en grasas y azúcar)    ¨ Mezclas para hacer pan de maíz y pasteles, alimentos empacados, pelon rellenos para pavo, mezclas para hacer arroz y pasta, macarrones con [place]-barre, y cereales instantáneos (altos en sodio)    · Frutas y verduras:      ¨ Vegetales regulares enlatados (altos en sodio)    ¨ Repollo preparado en vinagre, vegetales en vinagre, y otros alimentos preparados en escabeche (altos en sodio)    ¨ Vegetales fritos o vegetales en mantequilla o salsas altas en grasas    ¨ Frutas en crema o salsa de mantequilla (altas en grasas)    · Productos lácteos:      ¨ Leche entera, leche semi descremada (2%), y crema (fan en grasas)    ¨ Queso regular y queso procesado (alto en grasa y sodio)    · Alberto y alimentos con proteínas:      ¨ Alberto ahumadas o curadas, pelon carne de candy en conserva, tocino, jamón, perros calientes, y salchicha (fan en grasas y sodio)    ¨ Frijoles enlatados y alberto enlatadas o alberto en pasta, pelon alberto en conserva, darren, anchoas y [address] de imitación (altos en sodio)    ¨ Alberto para emparedados, pelon [place], [place], Arjun, y carne de res en rebanada (altos en sodio)    ¨ Alberto altas en grasas (bistec estilo [place], carne molida para hamburguesas, costillas)    ¨ Huevos enteros y yemas de huevo (altos en grasas)    · Otros:      ¨ Condimentos hechos con sal, pelon sal de ajo, sal de apio, sal de cebolla, sal condimentada, suavizantes para alberto, y glutamato de monosodio (MSG, por jimmy siglas en inglés)    ¨ Sopa Miso y mezclas para sopa enlatadas o secas (altas en sodio)    ¨ Salsa de soya regular, salsa de [place], salsa [place], salsa para bistec, [place] [employer], y [address] [place] [address] Ave  vinagres con sabor (altas en sodio)    ¨ Condimentos regulares, pelon [place], salsa de tomate y aderezos para ensalada (altos en sodio)    ¨ Salsa para dominic y salsas en general, pelon salsa [place] o de [place]-barre (altas en sodio y [place])    ¨ Bebidas altas en azúcar, pelon bebidas gaseosas o jugos de frutas    ¨ Alimentos para [address], pelon epi tostadas, palomitas de [place], pretzels, piel de cerdo, [address] de soda [facility], y nueces saladas    ¨ Alimentos congelados, pelon cenas preparadas, platos principales, vegetales con salsas, y dominic cubiertas en pan (altas en sodio)  Otras pautas que debe seguir:   · Mantenga un peso saludable  Tucker riesgo de enfermedad cardíaca es aún más alto si usted tiene sobrepeso  Tucker médico podría sugerirle que adelgace si tiene sobrepeso  Usted puede perder peso si se propone consumir menos calorías y alimentos que tengan azúcar y grasas agregadas  El plan de alimentación DASH puede ayudarle a lograrlo  Marianne buena forma de disminuir el consumo de calorías es consumiendo porciones más pequeñas en cada comida y menos meriendas entre comidas  Consulte a tucker médico para obtener más información sobre cómo adelgazar  · Realice actividad física con regularidad  El ejercicio regular puede ayudarle a alcanzar o mantener un peso saludable  El ejercicio regular también puede ayudarle a disminuir tucker presión arterial y mejorar jimmy niveles de colesterol  Iris ejercicios moderados por 30 minutos o más todos los días de la [place]  Para bajar peso, asegúrese de ejercitarse por lo menos 60 minutos  Consulte con tucker médico sobre un programa de ejercicio adecuado para usted  · Limite el consumo de alcohol  Las mujeres deberían limitar el consumo de alcohol a 1 bebida por día  Los hombres deberían limitar el consumo de alcohol a 2 tragos al día  Un trago equivale a 12 onzas de cerveza, 5 onzas de vino o 1 onza y ½ de licor    © 2017 [address] Information is for End User's use only and may not be sold, redistributed or otherwise used for commercial purposes  All illustrations and images included in CareNotes® are the copyrighted property of A D A M , Inc  or Geronimo Timmons  Esta información es sólo para uso en educación  Tucker intención no es darle un consejo médico sobre enfermedades o tratamientos  Colsulte con tucker Anastacio [place] farmacéutico antes de seguir cualquier régimen médico para saber si es seguro y efectivo para usted

## 2020-08-17 NOTE — ASSESSMENT & PLAN NOTE
-HTN very well-controlled on amlodipine 2 5 mg after patient unable to tolerate ACEI  -Denies side effects, no pedal edema on exam  -BP log revealed BP 110s-130s/70s-80s, today's /80, at goal  -Continue current medication, no refill needed today  -Discussed patient's BP goal in detail with her today  -Advised patient on symptoms of hypotension & severe HTN  -Limit salt-intake & caffeine in diet, minimize stress level  -DASH diet handout given via AVS (in Fijian)   -Discussed importance of treating HTN to prevent ASCVD, CVA, CKD/ESRD  -Follow up in 3 months for re-check, refill of amlodipine  -Will check fasting CMP, lipid panel, TSH at next visit

## 2020-08-17 NOTE — ASSESSMENT & PLAN NOTE
-Symptoms of post-nasal drip improving with flonase and claritin  -Serous otitis improved but still some clear effusions behind TMs  -Continue current regimen, reassess at next visit

## 2020-11-12 DIAGNOSIS — I10 HYPERTENSION, UNSPECIFIED TYPE: ICD-10-CM

## 2020-11-12 RX ORDER — AMLODIPINE BESYLATE 2.5 MG/1
2.5 TABLET ORAL DAILY
Qty: 90 TABLET | Refills: 0 | Status: SHIPPED | OUTPATIENT
Start: 2020-11-12 | End: 2021-02-08 | Stop reason: SDUPTHER

## 2020-11-23 ENCOUNTER — OFFICE VISIT (OUTPATIENT)
Dept: FAMILY MEDICINE CLINIC | Facility: CLINIC | Age: 70
End: 2020-11-23
Payer: OTHER GOVERNMENT

## 2020-11-23 VITALS
BODY MASS INDEX: 21.55 KG/M2 | DIASTOLIC BLOOD PRESSURE: 88 MMHG | OXYGEN SATURATION: 99 % | HEART RATE: 104 BPM | TEMPERATURE: 96.5 F | RESPIRATION RATE: 18 BRPM | HEIGHT: 62 IN | WEIGHT: 117.13 LBS | SYSTOLIC BLOOD PRESSURE: 130 MMHG

## 2020-11-23 DIAGNOSIS — R07.89 CHEST PRESSURE: ICD-10-CM

## 2020-11-23 DIAGNOSIS — J30.2 SEASONAL ALLERGIC RHINITIS, UNSPECIFIED TRIGGER: ICD-10-CM

## 2020-11-23 DIAGNOSIS — I10 ESSENTIAL HYPERTENSION: Primary | ICD-10-CM

## 2020-11-23 PROCEDURE — 99214 OFFICE O/P EST MOD 30 MIN: CPT | Performed by: FAMILY MEDICINE

## 2020-11-23 RX ORDER — FLUTICASONE PROPIONATE 50 MCG
1 SPRAY, SUSPENSION (ML) NASAL DAILY
Qty: 16 G | Refills: 3 | Status: SHIPPED | OUTPATIENT
Start: 2020-11-23 | End: 2020-11-24 | Stop reason: SDUPTHER

## 2020-11-24 PROBLEM — R07.89 CHEST PRESSURE: Status: ACTIVE | Noted: 2020-11-24

## 2020-11-24 RX ORDER — FLUTICASONE PROPIONATE 50 MCG
1 SPRAY, SUSPENSION (ML) NASAL DAILY
Qty: 16 G | Refills: 3 | Status: SHIPPED | OUTPATIENT
Start: 2020-11-24

## 2021-01-04 ENCOUNTER — OFFICE VISIT (OUTPATIENT)
Dept: FAMILY MEDICINE CLINIC | Facility: CLINIC | Age: 71
End: 2021-01-04
Payer: OTHER GOVERNMENT

## 2021-01-04 VITALS
OXYGEN SATURATION: 98 % | WEIGHT: 116 LBS | DIASTOLIC BLOOD PRESSURE: 88 MMHG | HEIGHT: 62 IN | BODY MASS INDEX: 21.35 KG/M2 | HEART RATE: 103 BPM | TEMPERATURE: 96.5 F | RESPIRATION RATE: 18 BRPM | SYSTOLIC BLOOD PRESSURE: 146 MMHG

## 2021-01-04 DIAGNOSIS — H26.9 CATARACT OF LEFT EYE, UNSPECIFIED CATARACT TYPE: Primary | ICD-10-CM

## 2021-01-04 DIAGNOSIS — Z01.818 PREOPERATIVE EXAMINATION: ICD-10-CM

## 2021-01-04 PROCEDURE — 99213 OFFICE O/P EST LOW 20 MIN: CPT | Performed by: FAMILY MEDICINE

## 2021-01-04 NOTE — PROGRESS NOTES
[address] [place] [address]  1950    Karen Wing is a 65 y o  female with cataracts OU who is planning to undergo cataract extraction with IOL placement under monitored anesthesia by Dr Tiana Woods on 1/14/21 (OS) and 2/4/21 (OD)  The procedure is indicated for the following condition: vision obstruction 2/2 cataracts OU  Patient has not had complications with anesthesia in the past     ROS:    Chest pain: no    Shortness of breath: no   Shortness of breath with exertion: not often   Orthopnea: no   Dizziness: no   Unexplained weight change: no    PMH:   CAD: no   HTN: yes, well-controlled on amlodipine 2 5 mg QD   CKD: no   DM: no/on insulin: no   History of CVA: no    She  reports that she has never smoked  She has never used smokeless tobacco  She reports that she does not drink alcohol or use drugs  /88   Pulse 103   Temp (!) 96 5 °F (35 8 °C)   Resp 18   Ht 5' 2" (1 575 m)   Wt 52 6 kg (116 lb)   SpO2 98%   BMI 21 22 kg/m²   Physical Exam  Vitals signs and nursing note reviewed  Constitutional:       General: She is not in acute distress  Appearance: Normal appearance  She is well-developed and normal weight  HENT:      Head: Normocephalic and atraumatic  Eyes:      Extraocular Movements: Extraocular movements intact  Conjunctiva/sclera: Conjunctivae normal       Comments: Arcus senilis OU   Neck:      Musculoskeletal: Neck supple  Cardiovascular:      Rate and Rhythm: Normal rate and regular rhythm  Pulmonary:      Effort: Pulmonary effort is normal  No respiratory distress  Breath sounds: Normal breath sounds  Abdominal:      Palpations: Abdomen is soft  Tenderness: There is no abdominal tenderness  Musculoskeletal: Normal range of motion  General: No swelling  Skin:     General: Skin is warm and dry  Capillary Refill: Capillary refill takes less than 2 seconds     Neurological: General: No focal deficit present  Mental Status: She is alert  Mental status is at baseline  Psychiatric:         Mood and Affect: Mood normal          Behavior: Behavior normal          Thought Content: Thought content normal          Judgment: Judgment normal       Comments: Very pleasant          Revised Cardiac Risk Index (RCRI) for Pre-Operative Risk   (estimates risk of cardiac complications after noncardiac surgery)    · High-risk surgery: No 0 / Yes +1  · Intraperitoneal, intrathoracic, suprainguinal vascular  · History of ischemic heart disease: No 0 / Yes +1  · Hx of MI, (+) exercise test, current chest pain considered due to myocardial ischemia, use of nitrate therapy or ECG with pathological Q waves)  · History of CHF: No 0 / Yes +1  · Pulmonary edema, B/L rales or S3 gallop; GRIFFITH, orthopnea, PND, CXR showing pulmonary vascular redistribution)  · History of cerebrovascular disease: No 0 / Yes +1  · Prior TIA or stroke  · Pre-operative treatment with insulin: No 0 / Yes +1  · Pre-operative creatinine >2 mg/dL: No 0 / Yes +1    RCRI Scoring:  · 0 points: Class I Risk, 3 9% 30-day risk of death, MI, or cardiac arrest      Lab Results   Component Value Date    CREATININE 0 81 07/12/2020       POCT EKG: Nonspecific slight T wave flattening/inversions in lateral and inferior leads in comparison to previous EKG 7/12/20    Rocael Spencer was seen today for pre-op exam     Diagnoses and all orders for this visit:    Cataract of left eye, unspecified cataract type    Preoperative examination      Recommendations:  Marley Arroyo is undergoing an elective Low Risk surgery, cataract extraction OS/OD w/ IOL placement   She is RCRI class I risk (0 points) with 3 9% 30-day risk of death, MI, or cardiac arrest  EKG obtained today given intermittent SOBOE (likely 2/2 deconditioning) and previously reported chest pressure (now resolved); slight T wave inversions/flattening in lateral and inferior leads as noted above, nonspecific changes from previous in July 2020  Since cataract surgery is low-risk and EKG findings are nonspecific, patient may proceed with surgery as planned without further workup prior to surgery  However, stress test slip re-printed and given to patient today and patient instructed to schedule appt for stress test after surgery  Pre-operative form completed and faxed today to office as requested

## 2021-02-08 DIAGNOSIS — I10 HYPERTENSION, UNSPECIFIED TYPE: ICD-10-CM

## 2021-02-10 RX ORDER — AMLODIPINE BESYLATE 2.5 MG/1
2.5 TABLET ORAL DAILY
Qty: 90 TABLET | Refills: 0 | Status: SHIPPED | OUTPATIENT
Start: 2021-02-10 | End: 2021-05-11 | Stop reason: SDUPTHER

## 2021-04-15 ENCOUNTER — TELEPHONE (OUTPATIENT)
Dept: FAMILY MEDICINE CLINIC | Facility: CLINIC | Age: 71
End: 2021-04-15

## 2021-05-11 DIAGNOSIS — I10 HYPERTENSION, UNSPECIFIED TYPE: ICD-10-CM

## 2021-05-11 RX ORDER — AMLODIPINE BESYLATE 2.5 MG/1
2.5 TABLET ORAL DAILY
Qty: 90 TABLET | Refills: 0 | Status: SHIPPED | OUTPATIENT
Start: 2021-05-11 | End: 2021-05-27 | Stop reason: ALTCHOICE

## 2021-05-27 ENCOUNTER — OFFICE VISIT (OUTPATIENT)
Dept: FAMILY MEDICINE CLINIC | Facility: CLINIC | Age: 71
End: 2021-05-27
Payer: OTHER GOVERNMENT

## 2021-05-27 VITALS
BODY MASS INDEX: 21.53 KG/M2 | HEART RATE: 100 BPM | DIASTOLIC BLOOD PRESSURE: 80 MMHG | WEIGHT: 117 LBS | TEMPERATURE: 98.2 F | SYSTOLIC BLOOD PRESSURE: 120 MMHG | HEIGHT: 62 IN | OXYGEN SATURATION: 98 %

## 2021-05-27 DIAGNOSIS — I10 ESSENTIAL HYPERTENSION: Primary | ICD-10-CM

## 2021-05-27 PROCEDURE — 99213 OFFICE O/P EST LOW 20 MIN: CPT | Performed by: FAMILY MEDICINE

## 2021-05-27 NOTE — PATIENT INSTRUCTIONS
Please stop taking the amlodipine now  record your blood pressures twice a week and bring the paper to the next visit in 1 month

## 2021-05-27 NOTE — PROGRESS NOTES
Assessment/Plan:       Problem List Items Addressed This Visit        Cardiovascular and Mediastinum    Hypertension - Primary     -Patient's BP appears well-below goal for age  -Having some symptoms of orthostatism   -Will trial off amlodipine and re-evaluate in 1 month                 Subjective:      Patient ID: Kelly Cardona is a 66 y o  female  HPI    Patient seen 1/4/21 for pre-op exam and stress test re-printed for her given intermittent SOBOE/chest pressure and nonspecific slight T wave flattening/inversions in lateral and inferior leads in comparison to previous EKG 7/12/20; denies these symptoms today and says she has been walking around more and conditioning herself to be more "in shape"  Still taking amlodipine 2 5 mg, but report BP at home has been well below 150/90 and at times she has felt lightheaded when standing  The following portions of the patient's history were reviewed and updated as appropriate: allergies, current medications, past family history, past medical history, past social history, past surgical history and problem list     Review of Systems   Constitutional: Negative for chills and fever  Respiratory: Negative for chest tightness and shortness of breath  Cardiovascular: Negative for chest pain, palpitations and leg swelling  Gastrointestinal: Negative for abdominal pain, diarrhea, nausea and vomiting  Genitourinary: Negative for dysuria  Neurological: Positive for light-headedness  Negative for syncope and weakness  Psychiatric/Behavioral: Negative for agitation, sleep disturbance and suicidal ideas  All other systems reviewed and are negative  Objective:      /80   Pulse 100   Temp 98 2 °F (36 8 °C)   Ht 5' 2" (1 575 m)   Wt 53 1 kg (117 lb)   SpO2 98%   BMI 21 40 kg/m²          Physical Exam  Vitals signs reviewed  Constitutional:       General: She is not in acute distress  Appearance: She is well-developed     HENT:      Head: Normocephalic and atraumatic  Neck:      Musculoskeletal: Normal range of motion and neck supple  Thyroid: No thyromegaly  Cardiovascular:      Rate and Rhythm: Normal rate and regular rhythm  Pulmonary:      Effort: Pulmonary effort is normal  No respiratory distress  Breath sounds: Normal breath sounds  Abdominal:      General: Bowel sounds are normal  There is no distension  Palpations: Abdomen is soft  Tenderness: There is no abdominal tenderness  Musculoskeletal: Normal range of motion  Skin:     General: Skin is warm and dry  Findings: No rash  Neurological:      Mental Status: She is alert and oriented to person, place, and time     Psychiatric:         Mood and Affect: Mood normal          Behavior: Behavior normal

## 2021-06-09 NOTE — ASSESSMENT & PLAN NOTE
-Patient's BP appears well-below goal for age  -Having some symptoms of orthostatism   -Will trial off amlodipine and re-evaluate in 1 month

## 2021-06-24 ENCOUNTER — OFFICE VISIT (OUTPATIENT)
Dept: FAMILY MEDICINE CLINIC | Facility: CLINIC | Age: 71
End: 2021-06-24
Payer: OTHER GOVERNMENT

## 2021-06-24 VITALS
OXYGEN SATURATION: 98 % | WEIGHT: 116 LBS | HEIGHT: 62 IN | DIASTOLIC BLOOD PRESSURE: 90 MMHG | SYSTOLIC BLOOD PRESSURE: 140 MMHG | HEART RATE: 90 BPM | BODY MASS INDEX: 21.35 KG/M2 | TEMPERATURE: 98.7 F

## 2021-06-24 DIAGNOSIS — I10 ESSENTIAL HYPERTENSION: Primary | ICD-10-CM

## 2021-06-24 DIAGNOSIS — Z91.89 NEED FOR DENTAL CARE: ICD-10-CM

## 2021-06-24 PROBLEM — R07.89 CHEST PRESSURE: Status: RESOLVED | Noted: 2020-11-24 | Resolved: 2021-06-24

## 2021-06-24 PROCEDURE — 99213 OFFICE O/P EST LOW 20 MIN: CPT | Performed by: FAMILY MEDICINE

## 2021-06-24 NOTE — PATIENT INSTRUCTIONS
Por favor, vaya al laboratorio para las pruebas del gucci a evaluar paulina rinones, Bent, y gucci    Tinnitus   CUIDADO AMBULATORIO:   Tinnitus es cuando usted escucha un campanilleo, chasquido, zumbido o siseo en vita o ambos oídos  También se puede percibir en forma de silbido, ronroneo, estruendo o cantar de grillos, entre otros sonidos  El akila puede ser leve o alto y con un rashaad grave o kenia  El tinnitus que dura más de 6 meses se considera crónico  Es probable que el tinnitus sea causado por problemas con tucker sistema auditivo, incluyendo las partes de tucker cerebro que clasifica los sonidos  También puede ser el resultado de un trastorno de jovani, pelon la enfermedad de [place]  Llame al 911 si:  · Usted siente deseos de lastimarse o lastimar a otras personas debido al ruido mike  Comuníquese con tucker médico si:  · Usted tiene kristina de [place]  · Usted está cansado y le lianet concentrarse o recordar cosas  · Usted tiene más ansiedad o estrés que lo usual     · Usted tiene law inmensa tristeza y depresión  · Usted tiene dificultad para conciliar el sueño o permanecer dormido  · Los síntomas no desaparecen o empeoran  · Usted tiene preguntas o inquietudes acerca de tucker condición o cuidado  El tratamiento para tinnitus podría no ser necesario  Paulina síntomas sólo podrían regresar cuando usted se encuentre ansioso o estresado  Tucker médico podría suspenderle ciertos medicamentos que le estén causando tucker tinnitus  Usted podría necesitar medicamentos para aliviar paulina síntomas  Lo siguiente puede ayudar a tratar o manejar tucker tinnitus:  · La terapia sirve para enseñarle formas para relajarse, disminuir el estrés y que escuche menos los zumbidos en el oído  · Terapia cognitivo conductual sirve para comprender tucker condición  El terapeuta le ayudará a sobrellevar tucker tinnitus  También puede aprender nuevas formas de relajarse y adquirir habilidades para atenuar los síntomas      · La terapia de akila, pelon las máquinas que emiten un akila rausch, pueden ayudar a tapar los zumbidos con un akila placentero  La terapia de akila puede ayudarlo a conciliar el sueño o a relajarse  Estos aparatos pueden llevarse en blackburn oreja o ser colocados sobre blackburn mesita de noche  · Los aparatos auditivos o el implante coclear pueden servir si usted tiene pérdida de la audición  · La cirugía puede ser necesaria si blackburn tinnitus es causado por law anormalidad en las arterias o si tiene law masa  · No fume  La nicotina disminuye el flujo sanguíneo a blackburn oído y [employer]  No use cigarrillos electrónicos o tabaco sin humo en vez de cigarrillos o para tratar de dejar de fumar  Todos estos aún contienen nicotina  Pida a blackburn médico información si usted fuma actualmente y [place] para dejar de hacerlo  · Brianafurt cantidad de alcohol y cafeína que usted kerry  El alcohol y la cafeína pueden empeorar blackburn tinnitus  Prevenga el tinnitus:  · Evite la exposición a sonidos debbie, pelon música fan o herramientas eléctricas  La exposición ocasional incluso podría causar tinnitus  Aléjese del ruido o baje el volumen  · Use protección auditiva cuando vaya a estar expuesto a sonidos debbie  Law buena protección auditiva consta de tapones para los oídos o orejeras para reducir el akila  Acuda a jimmy consultas de control con blackburn médico según le indicaron  Anote jimmy preguntas para que se acuerde de hacerlas alonzo jimmy visitas  © Copyright [facility] [employer] Information is for End User's use only and may not be sold, redistributed or otherwise used for commercial purposes  All illustrations and images included in CareNotes® are the copyrighted property of A D A M , [address] es sólo para uso en educación  Blackburn intención no es darle un consejo médico sobre enfermedades o tratamientos   Colsulte con blackburn Keating Johnson farmacéutico antes de seguir cualquier Bevon Davian médico para saber si es seguro y efectivo para usted

## 2021-06-24 NOTE — ASSESSMENT & PLAN NOTE
-Amlodipine discontinued at last month's visit due to BP below goal and orthostatism   -Patient's BP remains at goal for age off medication per review of home BP log  -Symptoms of orthostatism have resolved per patient, feels well  -Continue off antihypertensive therapy at this time  -Advised on measuring BP if symptoms arise, otherwise routine monitoring at her discretion  -Re-printed lab orders and patient given information on arranging phlebotomy to come to her home to draw labs, since she does not like to leave the house (unless she has to) due to COVID-19  -Follow up in 6 months, sooner if labs come back abnormal

## 2021-06-24 NOTE — PROGRESS NOTES
Assessment/Plan:       Problem List Items Addressed This Visit        Cardiovascular and Mediastinum    Hypertension - Primary     -Amlodipine discontinued at last month's visit due to BP below goal and orthostatism   -Patient's BP remains at goal for age off medication per review of home BP log  -Symptoms of orthostatism have resolved per patient, feels well  -Continue off antihypertensive therapy at this time  -Advised on measuring BP if symptoms arise, otherwise routine monitoring at her discretion  -Re-printed lab orders and patient given information on arranging phlebotomy to come to her home to draw labs, since she does not like to leave the house (unless she has to) due to COVID-19  -Follow up in 6 months, sooner if labs come back abnormal           Other Visit Diagnoses     Need for dental care        Relevant Orders    Ambulatory referral to Dentistry            Subjective:      Patient ID: Anthony Rodriguez is a 66 y o  female  HPI    Patient taken off amlodipine at last visit due to BP being below goal for age and patient experiencing some symptoms of orthostatism; she was asked to monitor her BP at home, BP log is below and reveals her BP has been at goal without amlodipine  Feels better after stopping the amlodipine as well  Denies SOB or chest pain, denies presyncope/syncope  Still having some tinnitus but says it is better with the flonase and zyrtec  Also asks for a referral to Dentistry in the area for her yearly check up  Has not yet gotten labwork done, needs lab orders re-printed today  No other issues  Still does not want to get COVID-19 vaccine at this time  She is exercising daily, eating well, sleep is fine other than being disturbed by noisy neighbors in her building  Not taking PPI          The following portions of the patient's history were reviewed and updated as appropriate: allergies, current medications, past family history, past medical history, past social history, past surgical history and problem list     Review of Systems   Constitutional: Negative for chills and fever  HENT: Positive for congestion, postnasal drip, rhinorrhea and tinnitus  Eyes: Negative for visual disturbance  Respiratory: Negative for chest tightness and shortness of breath  Cardiovascular: Negative for chest pain, palpitations and leg swelling  Gastrointestinal: Negative for abdominal pain, diarrhea, nausea and vomiting  Genitourinary: Negative for dysuria  Musculoskeletal: Negative for gait problem  Neurological: Negative for syncope, weakness and light-headedness  Psychiatric/Behavioral: Negative for agitation, sleep disturbance and suicidal ideas  All other systems reviewed and are negative  Objective:    /90   Pulse 90   Temp 98 7 °F (37 1 °C)   Ht 5' 2" (1 575 m)   Wt 52 6 kg (116 lb)   SpO2 98%   BMI 21 22 kg/m²        Physical Exam  Vitals reviewed  Constitutional:       General: She is not in acute distress  Appearance: Normal appearance  She is not toxic-appearing  HENT:      Right Ear: Ear canal normal       Left Ear: Ear canal normal       Ears:      Comments: B/L clear effusions behind TMs but no bulging or purulence     Nose:      Comments: Turbinates appear less erythematous than previously  Eyes:      Comments: Arcus senilis OU   Cardiovascular:      Rate and Rhythm: Normal rate and regular rhythm  Pulmonary:      Effort: Pulmonary effort is normal       Breath sounds: Normal breath sounds  Abdominal:      General: Abdomen is flat  Palpations: Abdomen is soft  Musculoskeletal:         General: No swelling or tenderness  Normal range of motion  Skin:     General: Skin is warm and dry  Neurological:      General: No focal deficit present  Mental Status: She is alert and oriented to person, place, and time  Psychiatric:         Mood and Affect: Mood normal          Behavior: Behavior normal          Thought Content:  Thought content normal          Judgment: Judgment normal

## 2022-12-11 ENCOUNTER — APPOINTMENT (EMERGENCY)
Dept: RADIOLOGY | Facility: HOSPITAL | Age: 72
End: 2022-12-11

## 2022-12-11 ENCOUNTER — APPOINTMENT (EMERGENCY)
Dept: CT IMAGING | Facility: HOSPITAL | Age: 72
End: 2022-12-11

## 2022-12-11 ENCOUNTER — HOSPITAL ENCOUNTER (EMERGENCY)
Facility: HOSPITAL | Age: 72
Discharge: HOME | End: 2022-12-11
Attending: EMERGENCY MEDICINE

## 2022-12-11 VITALS
HEART RATE: 100 BPM | DIASTOLIC BLOOD PRESSURE: 98 MMHG | OXYGEN SATURATION: 98 % | SYSTOLIC BLOOD PRESSURE: 143 MMHG | HEIGHT: 62 IN | RESPIRATION RATE: 15 BRPM | WEIGHT: 118.39 LBS | BODY MASS INDEX: 21.79 KG/M2 | TEMPERATURE: 98.6 F

## 2022-12-11 DIAGNOSIS — I10 UNCONTROLLED HYPERTENSION: Primary | ICD-10-CM

## 2022-12-11 DIAGNOSIS — H43.399: ICD-10-CM

## 2022-12-11 LAB
2HR DELTA HS TROPONIN: 1 NG/L
ALBUMIN SERPL BCP-MCNC: 4.2 G/DL (ref 3.5–5)
ALP SERPL-CCNC: 105 U/L (ref 34–104)
ALT SERPL W P-5'-P-CCNC: 18 U/L (ref 7–52)
ANION GAP SERPL CALCULATED.3IONS-SCNC: 9 MMOL/L (ref 4–13)
AST SERPL W P-5'-P-CCNC: 29 U/L (ref 13–39)
ATRIAL RATE: 100 BPM
BASOPHILS # BLD AUTO: 0.03 THOUSANDS/ÂΜL (ref 0–0.1)
BASOPHILS NFR BLD AUTO: 0 % (ref 0–1)
BILIRUB SERPL-MCNC: 0.59 MG/DL (ref 0.2–1)
BILIRUB UR QL STRIP: NEGATIVE
BNP SERPL-MCNC: 40 PG/ML (ref 0–100)
BUN SERPL-MCNC: 16 MG/DL (ref 5–25)
CALCIUM SERPL-MCNC: 9.7 MG/DL (ref 8.4–10.2)
CARDIAC TROPONIN I PNL SERPL HS: 5 NG/L
CARDIAC TROPONIN I PNL SERPL HS: 6 NG/L
CHLORIDE SERPL-SCNC: 102 MMOL/L (ref 96–108)
CLARITY UR: CLEAR
CO2 SERPL-SCNC: 28 MMOL/L (ref 21–32)
COLOR UR: YELLOW
CREAT SERPL-MCNC: 0.78 MG/DL (ref 0.6–1.3)
EOSINOPHIL # BLD AUTO: 0.13 THOUSAND/ÂΜL (ref 0–0.61)
EOSINOPHIL NFR BLD AUTO: 2 % (ref 0–6)
ERYTHROCYTE [DISTWIDTH] IN BLOOD BY AUTOMATED COUNT: 12 % (ref 11.6–15.1)
GFR SERPL CREATININE-BSD FRML MDRD: 75 ML/MIN/1.73SQ M
GLUCOSE SERPL-MCNC: 109 MG/DL (ref 65–140)
GLUCOSE UR STRIP-MCNC: NEGATIVE MG/DL
HCT VFR BLD AUTO: 43.5 % (ref 34.8–46.1)
HGB BLD-MCNC: 14.4 G/DL (ref 11.5–15.4)
HGB UR QL STRIP.AUTO: NEGATIVE
IMM GRANULOCYTES # BLD AUTO: 0.02 THOUSAND/UL (ref 0–0.2)
IMM GRANULOCYTES NFR BLD AUTO: 0 % (ref 0–2)
KETONES UR STRIP-MCNC: NEGATIVE MG/DL
LEUKOCYTE ESTERASE UR QL STRIP: NEGATIVE
LYMPHOCYTES # BLD AUTO: 1.73 THOUSANDS/ÂΜL (ref 0.6–4.47)
LYMPHOCYTES NFR BLD AUTO: 22 % (ref 14–44)
MAGNESIUM SERPL-MCNC: 2.3 MG/DL (ref 1.9–2.7)
MCH RBC QN AUTO: 29.4 PG (ref 26.8–34.3)
MCHC RBC AUTO-ENTMCNC: 33.1 G/DL (ref 31.4–37.4)
MCV RBC AUTO: 89 FL (ref 82–98)
MONOCYTES # BLD AUTO: 0.48 THOUSAND/ÂΜL (ref 0.17–1.22)
MONOCYTES NFR BLD AUTO: 6 % (ref 4–12)
NEUTROPHILS # BLD AUTO: 5.32 THOUSANDS/ÂΜL (ref 1.85–7.62)
NEUTS SEG NFR BLD AUTO: 70 % (ref 43–75)
NITRITE UR QL STRIP: NEGATIVE
NRBC BLD AUTO-RTO: 0 /100 WBCS
P AXIS: 64 DEGREES
PH UR STRIP.AUTO: 8 [PH]
PLATELET # BLD AUTO: 263 THOUSANDS/UL (ref 149–390)
PMV BLD AUTO: 11.6 FL (ref 8.9–12.7)
POTASSIUM SERPL-SCNC: 3.9 MMOL/L (ref 3.5–5.3)
PR INTERVAL: 130 MS
PROT SERPL-MCNC: 7.7 G/DL (ref 6.4–8.4)
PROT UR STRIP-MCNC: NEGATIVE MG/DL
QRS AXIS: 60 DEGREES
QRSD INTERVAL: 90 MS
QT INTERVAL: 360 MS
QTC INTERVAL: 464 MS
RBC # BLD AUTO: 4.9 MILLION/UL (ref 3.81–5.12)
SODIUM SERPL-SCNC: 139 MMOL/L (ref 135–147)
SP GR UR STRIP.AUTO: 1.01 (ref 1–1.03)
T WAVE AXIS: 12 DEGREES
TSH SERPL DL<=0.05 MIU/L-ACNC: 4.21 UIU/ML (ref 0.45–4.5)
UROBILINOGEN UR QL STRIP.AUTO: 0.2 E.U./DL
VENTRICULAR RATE: 100 BPM
WBC # BLD AUTO: 7.71 THOUSAND/UL (ref 4.31–10.16)

## 2022-12-11 RX ORDER — LABETALOL HYDROCHLORIDE 5 MG/ML
10 INJECTION, SOLUTION INTRAVENOUS ONCE
Status: DISCONTINUED | OUTPATIENT
Start: 2022-12-11 | End: 2022-12-11

## 2022-12-11 RX ORDER — KETOROLAC TROMETHAMINE 30 MG/ML
15 INJECTION, SOLUTION INTRAMUSCULAR; INTRAVENOUS ONCE
Status: DISCONTINUED | OUTPATIENT
Start: 2022-12-11 | End: 2022-12-11 | Stop reason: HOSPADM

## 2022-12-11 NOTE — DISCHARGE INSTRUCTIONS
Please return to the emergency department for worsening symptoms including chest pain, shortness of breath, dizziness, lightheadedness, fever greater than 103, severe pain, inability to walk, fainting episodes, etc  Please follow-up with your family practice provider as soon as possible  They will help manage your hypertension outpatient  I have given you referrals for a cardiologist and also an ophthalmologist   Please follow-up for the high blood pressure and visual floaters respectively

## 2022-12-12 NOTE — ED PROVIDER NOTES
History  Chief Complaint   Patient presents with   • Hypertension     Reports feeling lightheaded, headache and "dizzy spells" B/L intermittent hand numbness, also "sees floaters", heart palpitations, x3 days HTN Hx d/c'd months ago     75-year-old female with past medical history significant for hypertension presenting to the emergency department today for high blood pressure associated with lightheadedness, headache, hand numbness, heart palpitations, and visual floaters  The patient noted that her blood pressure was high starting early this morning  It went as high as 324Q systolic  The patient notes visual floaters for approximately 2 years and has had cataract surgery in the past   She does note chest pain that is left-sided, intermittent, "stabbing" in sensation, and radiates to the left arm  She denies any shortness of breath  She denies any nausea, vomiting, diarrhea, or constipation  She has no abdominal pain or urinary symptoms  She has been on antihypertensive in the past but notes she has not been on them in some time  The patient denies other complaints at this time  History provided by:  Patient and relative   used: No    Hypertension  Severity:  Moderate  Onset quality:  Gradual  Duration: patient is uncertain  Timing:  Intermittent  Progression:  Worsening  Chronicity:  Recurrent  Context: not noncompliance    Relieved by:  None tried  Worsened by:  Nothing  Ineffective treatments:  None tried  Associated symptoms: blurred vision ("floaters"), chest pain, headaches and palpitations    Associated symptoms: no abdominal pain, no anxiety, no confusion, no dizziness, no ear pain, no epistaxis, no fatigue, no fever, no hematuria, no loss of consciousness, no nausea, no neck pain, no peripheral edema, no shortness of breath, no syncope, no tinnitus, not vomiting and no weakness        Prior to Admission Medications   Prescriptions Last Dose Informant Patient Reported? Taking? Calcium Carbonate-Vitamin D (CALCIUM 500/D PO)   Yes No   Sig: Take by mouth   fluticasone (FLONASE) 50 mcg/act nasal spray   No No   Si spray into each nostril daily   multivitamin (THERAGRAN) TABS   Yes No   Sig: Take 1 tablet by mouth daily   omeprazole (PriLOSEC) 20 mg delayed release capsule   No No   Sig: Take 1 capsule (20 mg total) by mouth daily      Facility-Administered Medications: None       Past Medical History:   Diagnosis Date   • Allergic    • Fibroids    • Hypertension        Past Surgical History:   Procedure Laterality Date   • HYSTERECTOMY     • TUBAL LIGATION     • UTERINE FIBROID SURGERY         Family History   Problem Relation Age of Onset   • Breast cancer Sister    • Prostate cancer Brother    • Colon cancer Brother    • Cancer Sister      I have reviewed and agree with the history as documented  E-Cigarette/Vaping   • E-Cigarette Use Never User      E-Cigarette/Vaping Substances   • Nicotine No    • THC No    • CBD No    • Flavoring No    • Other No    • Unknown No      Social History     Tobacco Use   • Smoking status: Never   • Smokeless tobacco: Never   Vaping Use   • Vaping Use: Never used   Substance Use Topics   • Alcohol use: Never   • Drug use: Never       Review of Systems   Constitutional: Negative for appetite change, chills, diaphoresis, fatigue and fever  HENT: Negative for ear pain, nosebleeds and tinnitus  Eyes: Positive for blurred vision ("floaters") and visual disturbance ("floaters")  Respiratory: Positive for chest tightness  Negative for cough, shortness of breath and wheezing  Cardiovascular: Positive for chest pain and palpitations  Negative for leg swelling and syncope  Gastrointestinal: Negative for abdominal pain, constipation, diarrhea, nausea and vomiting  Genitourinary: Negative for hematuria  Musculoskeletal: Negative for neck pain and neck stiffness  Skin: Negative for rash and wound     Neurological: Positive for light-headedness, numbness (bilateral hands only) and headaches  Negative for dizziness, seizures, loss of consciousness, syncope and weakness  Psychiatric/Behavioral: Negative for confusion  The patient is not nervous/anxious  All other systems reviewed and are negative  Physical Exam  Physical Exam  Vitals and nursing note reviewed  Constitutional:       General: She is not in acute distress  Appearance: Normal appearance  She is normal weight  She is not ill-appearing, toxic-appearing or diaphoretic  HENT:      Head: Normocephalic and atraumatic  Nose: Nose normal  No congestion or rhinorrhea  Mouth/Throat:      Mouth: Mucous membranes are moist       Pharynx: No oropharyngeal exudate or posterior oropharyngeal erythema  Eyes:      General: No scleral icterus  Right eye: No discharge  Left eye: No discharge  Extraocular Movements: Extraocular movements intact  Pupils: Pupils are equal, round, and reactive to light  Neck:      Comments: Nonmeningeal  Cardiovascular:      Rate and Rhythm: Normal rate and regular rhythm  Pulses: Normal pulses  Heart sounds: Normal heart sounds  No murmur heard  No friction rub  No gallop  Pulmonary:      Effort: Pulmonary effort is normal  No respiratory distress  Breath sounds: Normal breath sounds  No stridor  No wheezing, rhonchi or rales  Comments: Clear to auscultation bilaterally without adventitious breath sounds  Chest:      Chest wall: No tenderness  Abdominal:      General: Abdomen is flat  There is no distension  Palpations: Abdomen is soft  Tenderness: There is no abdominal tenderness  There is no right CVA tenderness, left CVA tenderness, guarding or rebound  Comments: Soft, nontender, nondistended, and without organomegaly   Musculoskeletal:         General: Normal range of motion  Cervical back: Normal range of motion  No tenderness  Right lower leg: No edema  Left lower leg: No edema  Comments: Negative Homans' sign bilaterally   Skin:     General: Skin is warm and dry  Capillary Refill: Capillary refill takes less than 2 seconds  Coloration: Skin is not jaundiced or pale  Neurological:      General: No focal deficit present  Mental Status: She is alert and oriented to person, place, and time  Mental status is at baseline        Comments: 5/5 strength in bilateral upper and lower extremities  Normal sensation of bilateral upper and lower extremities  The patient is able to smile, frown, puff out cheeks, and raise eyebrows bilaterally symmetrically without difficulty  Normal finger-to-nose examination  No cerebellar signs are dysdiadochokinesia  Overall, a normal neurologic assessment   Psychiatric:         Mood and Affect: Mood normal          Behavior: Behavior normal          Vital Signs  ED Triage Vitals [12/11/22 1554]   Temperature Pulse Respirations Blood Pressure SpO2   98 6 °F (37 °C) (!) 107 18 (!) 194/116 100 %      Temp Source Heart Rate Source Patient Position - Orthostatic VS BP Location FiO2 (%)   Oral Monitor Sitting Right arm --      Pain Score       8           Vitals:    12/11/22 1554 12/11/22 1615 12/11/22 1640 12/11/22 1739   BP: (!) 194/116 (!) 174/105 158/86 143/98   Pulse: (!) 107  97 100   Patient Position - Orthostatic VS: Sitting   Lying         Visual Acuity      ED Medications  Medications - No data to display    Diagnostic Studies  Results Reviewed     Procedure Component Value Units Date/Time    HS Troponin I 2hr [[id number]]  (Normal) Collected: 12/11/22 1803    Lab Status: Final result Specimen: Blood from Arm, Left Updated: 12/11/22 1832     hs TnI 2hr 6 ng/L      Delta 2hr hsTnI 1 ng/L     UA w Reflex to Microscopic w Reflex to Culture [[id number]]  (Normal) Collected: 12/11/22 1654    Lab Status: Final result Specimen: Urine, Clean Catch Updated: 12/11/22 1709     Color, UA Yellow     Clarity, UA Clear Specific [place], UA 1 010     pH, UA 8 0     Leukocytes, UA Negative     Nitrite, UA Negative     Protein, UA Negative mg/dl      Glucose, UA Negative mg/dl      Ketones, UA Negative mg/dl      Urobilinogen, UA 0 2 E U /dl      Bilirubin, UA Negative     Occult Blood, UA Negative    TSH [[id number]]  (Normal) Collected: 12/11/22 1615    Lab Status: Final result Specimen: Blood from Arm, Left Updated: 12/11/22 1656     TSH 3RD GENERATON 4 205 uIU/mL     Narrative:      Patients undergoing fluorescein dye angiography may retain small amounts of fluorescein in the body for 48-72 hours post procedure  Samples containing fluorescein can produce falsely depressed TSH values  If the patient had this procedure,a specimen should be resubmitted post fluorescein clearance        B-Type Natriuretic Peptide(BNP) AN, CA, EA Campuses Only [[id number]]  (Normal) Collected: 12/11/22 1615    Lab Status: Final result Specimen: Blood from Arm, Left Updated: 12/11/22 1651     BNP 40 pg/mL     HS Troponin 0hr (reflex protocol) [[id number]]  (Normal) Collected: 12/11/22 1615    Lab Status: Final result Specimen: Blood from Arm, Left Updated: 12/11/22 1648     hs TnI 0hr 5 ng/L     Comprehensive metabolic panel [[id number]]  (Abnormal) Collected: 12/11/22 1615    Lab Status: Final result Specimen: Blood from Arm, Left Updated: 12/11/22 1645     Sodium 139 mmol/L      Potassium 3 9 mmol/L      Chloride 102 mmol/L      CO2 28 mmol/L      ANION GAP 9 mmol/L      BUN 16 mg/dL      Creatinine 0 78 mg/dL      Glucose 109 mg/dL      Calcium 9 7 mg/dL      AST 29 U/L      ALT 18 U/L      Alkaline Phosphatase 105 U/L      Total Protein 7 7 g/dL      Albumin 4 2 g/dL      Total Bilirubin 0 59 mg/dL      eGFR 75 ml/min/1 73sq m     Narrative:      Paul guidelines for Chronic Kidney Disease (CKD):   •  Stage 1 with normal or high GFR (GFR > 90 mL/min/1 73 square meters)  •  Stage 2 Mild CKD (GFR = 60-89 mL/min/1 73 square meters)  •  Stage 3A Moderate CKD (GFR = 45-59 mL/min/1 73 square meters)  •  Stage 3B Moderate CKD (GFR = 30-44 mL/min/1 73 square meters)  •  Stage 4 Severe CKD (GFR = 15-29 mL/min/1 73 square meters)  •  Stage 5 End Stage CKD (GFR <15 mL/min/1 73 square meters)  Note: GFR calculation is accurate only with a steady state creatinine    Magnesium [[id number]]  (Normal) Collected: 12/11/22 1615    Lab Status: Final result Specimen: Blood from Arm, Left Updated: 12/11/22 1645     Magnesium 2 3 mg/dL     CBC and differential [[id number]] Collected: 12/11/22 1615    Lab Status: Final result Specimen: Blood from Arm, Left Updated: 12/11/22 1622     WBC 7 71 Thousand/uL      RBC 4 90 Million/uL      Hemoglobin 14 4 g/dL      Hematocrit 43 5 %      MCV 89 fL      MCH 29 4 pg      MCHC 33 1 g/dL      RDW 12 0 %      MPV 11 6 fL      Platelets 783 Thousands/uL      nRBC 0 /100 WBCs      Neutrophils Relative 70 %      Immat GRANS % 0 %      Lymphocytes Relative 22 %      Monocytes Relative 6 %      Eosinophils Relative 2 %      Basophils Relative 0 %      Neutrophils Absolute 5 32 Thousands/µL      Immature Grans Absolute 0 02 Thousand/uL      Lymphocytes Absolute 1 73 Thousands/µL      Monocytes Absolute 0 48 Thousand/µL      Eosinophils Absolute 0 13 Thousand/µL      Basophils Absolute 0 03 Thousands/µL                  CT head without contrast   Final Result by Delmis Echavarria MD (12/11 1651)      No acute intracranial abnormality  Workstation performed: [id number]         XR chest 1 view portable   Final Result by Delmis Echavarria MD (12/11 1652)      No acute cardiopulmonary disease                    Workstation performed: [id number]                    Procedures  ECG 12 Lead Documentation Only    Date/Time: 12/11/2022 4:12 PM  Performed by: Kade Wen PA-C  Authorized by: Kade Wen PA-C     Indications / Diagnosis:  Chest Pain  Patient location:  ED  Previous ECG: Previous ECG:  Unavailable  Interpretation:     Interpretation: non-specific    Rate:     ECG rate:  100    ECG rate assessment: normal    Rhythm:     Rhythm: sinus rhythm    Ectopy:     Ectopy: none    QRS:     QRS axis:  Normal  Conduction:     Conduction: normal    ST segments:     ST segments:  Normal  T waves:     T waves: non-specific    Comments:      Normal sinus rhythm with a rate of 100 without any ST segment changes or signs of ischemia  Normal axis  No ectopy  Nonspecific T wave inversions  [address] Ocular US    Date/Time: 12/11/2022 5:00 PM  Performed by: Jose Potter PA-C  Authorized by: Jose Potter PA-C     Patient location:  ED  Performed by:  NP/PA  Other Assisting Provider: No    Procedure details:     Exam Type:  Diagnostic    Indications: visual change      Assessment for: intraocular foreign body, retinal detachment, vitreous hemorrhage, lens dislocation and optic nerve sheath diameter      Eye(s) assessed: Both eyes    Structures visualized: anterior chamber, posterior chamber, lens and optic nerve      Image quality: limited diagnostic      Image availability:  Not saved  Left eye findings:     Foreign body: not identified      Retinal contour: normal      Lens: normal      Left optic nerve sheath diameter: normal (less than or equal to 5 mm)      Left optic nerve diameter (mm): 3 6mm  Right eye findings:     Foreign body: not identified      Retinal contour: normal      Lens: normal      Right optic nerve sheath diameter: normal (less than or equal to 5 mm)      Right optic nerve diameter (mm): 3 8mm    Interpretation:     Ocular US impressions: normal    Comments:      Optic nerve sheath diameter within normal limits bilaterally; low suspicion for papilledema  No evidence of vitreous hemorrhage, retinal detachment, or lens detachment bilaterally             ED Course  ED Course as of 12/14/22 0924   Sun Dec 11, 2022   1609 Vernell Emanuel is 1 5 - low risk for PE  CP is not pleuritic  No exogenous estrogen use  1719 Optic sheath measures 3 6mm on the left and 3 8mm on the right on ultrasonography  No evidence of retinal detachment or vitreous hemorrhage on ultrasound  HEART Risk Score    Flowsheet Row Most Recent Value   Heart Score Risk Calculator    History 1 Filed at: 12/11/2022 2047   ECG 1 Filed at: 12/11/2022 2047   Age 2 Filed at: 12/11/2022 2047   Risk Factors 1 Filed at: 12/11/2022 2047   Troponin 0 Filed at: 12/11/2022 2047   HEART Score 5 Filed at: 12/11/2022 2047                        SBIRT 22yo+    Flowsheet Row Most Recent Value   SBIRT (25 yo +)    In order to provide better care to our patients, we are screening all of our patients for alcohol and drug use  Would it be okay to ask you these screening questions? Yes Filed at: 12/11/2022 1605   Initial Alcohol Screen: US AUDIT-C     1  How often do you have a drink containing alcohol? 0 Filed at: 12/11/2022 1605   2  How many drinks containing alcohol do you have on a typical day you are drinking? 0 Filed at: 12/11/2022 1605   3a  Male UNDER 65: How often do you have five or more drinks on one occasion? 0 Filed at: 12/11/2022 1605   3b  FEMALE Any Age, or MALE 65+: How often do you have 4 or more drinks on one occassion? 0 Filed at: 12/11/2022 1605   Audit-C Score 0 Filed at: 12/11/2022 1605   ANSHUL: How many times in the past year have you    Used an illegal drug or used a prescription medication for non-medical reasons?  Never Filed at: 12/11/2022 1605          Wells' Criteria for PE    Flowsheet Row Most Recent Value   Wells' Criteria for PE    Clinical signs and symptoms of DVT 0 Filed at: 12/11/2022 1609   PE is primary diagnosis or equally likely 0 Filed at: 12/11/2022 1609   HR >100 1 5 Filed at: 12/11/2022 1609   Immobilization at least 3 days or Surgery in the previous 4 weeks 0 Filed at: 12/11/2022 1609   Previous, objectively diagnosed PE or DVT 0 Filed at: 12/11/2022 1609   Hemoptysis 0 Filed at: 12/11/2022 1609   Malignancy with treatment within 6 months or palliative 0 Filed at: 12/11/2022 1609   Wells' Criteria Total 1 5 Filed at: 12/11/2022 1609                MDM  Number of Diagnoses or Management Options  Uncontrolled hypertension: new and requires workup  Visual floaters: new and requires workup  Diagnosis management comments: 33-year-old female presenting to the emergency department today for chest pain, hypertension, lightheadedness, headache, bilateral hand numbness, and visual floaters  Hypertension and chest pain began earlier today  Patient notes she has had visual floaters for at least 2 years  Vital signs initially show hypertension but this gradually improved here in the emergency department with rest without any antihypertensives  Normal neurologic assessment  Negative Homans' sign bilaterally  Ultrasound of the bilateral eyes negative for any retinal detachment, lens abnormalities, vitreous hemorrhage, or evidence of papilledema  EKG shows normal sinus rhythm with a rate of 100 with nonspecific T wave inversions  Heart score is 5  Chest x-ray is without acute cardiopulmonary disease  No evidence of endorgan damage on lab work-up  CT head negative for any acute pathology  The patient is stable for discharge at this time  Visual disturbances to not appear associated with high blood pressure  Follow-up with cardiology, PCP, and ophthalmology  The patient is overall well-appearing  Strict return precautions were given  Recommend PCP follow-up as soon as possible  The patient and/or patient's proxy verify their understanding and agree to the plan at this time  All questions answered to the patient and/or their proxy's satisfaction  All labs reviewed and utilized in the medical decision making process    All radiology studies independently viewed by me and interpreted by the radiologist  Portions of the record may have been created with voice recognition software   Occasional wrong word or "sound a like" substitutions may have occurred due to the inherent limitations of voice recognition software   Read the chart carefully and recognize, using context, where substitutions have occurred  Amount and/or Complexity of Data Reviewed  Clinical lab tests: ordered and reviewed  Tests in the radiology section of CPT®: ordered and reviewed  Tests in the medicine section of CPT®: ordered and reviewed  Obtain history from someone other than the patient: yes  Independent visualization of images, tracings, or specimens: yes (CXR  EKG)    Patient Progress  Patient progress: stable      Disposition  Final diagnoses:   Uncontrolled hypertension   Visual floaters     Time reflects when diagnosis was documented in both MDM as applicable and the Disposition within this note     Time User Action Codes Description Comment    12/11/2022  6:35 PM Skippy Remberto Colon [I10] Uncontrolled hypertension     12/11/2022  6:35 PM [id number] Darlene Roberson [[id number]] Visual floaters       ED Disposition     ED Disposition   Discharge    Condition   Stable    Date/Time   Sun Dec 11, 2022  6:34 PM    Travessa Espinosa [address] discharge to home/self care                 Follow-up Information     Follow up With Specialties Details Why Contact Info Additional Information    Mt Saucedo MD [facility] Medicine Schedule an appointment as soon as possible for a visit   Rujimi Carreon [address] [place] [address] Emergency Department Emergency Medicine Go to  If symptoms worsen [address] [facility],[address] [address]  [address] [facility] Emergency Department, [address], [address] [facility] Tonya Reagan Paulson MD Ophthalmology Schedule an appointment as soon as possible for a visit   Lisa Malik [address]  [address] [facility] [phone]       [address] [facility] Cardiology Schedule an appointment as soon as possible for a visit   [place] [id number]  [phone] [address] [facility],[address] [place], [address] [facility] Ray [phone]          Discharge Medication List as of 12/11/2022  6:36 PM      CONTINUE these medications which have NOT CHANGED    Details   Calcium Carbonate-Vitamin D (CALCIUM 500/D PO) Take by mouth, Historical Med      fluticasone (FLONASE) 50 mcg/act nasal spray 1 spray into each nostril daily, Starting Tue 11/24/2020, Normal      multivitamin (THERAGRAN) TABS Take 1 tablet by mouth daily, Historical Med      omeprazole (PriLOSEC) 20 mg delayed release capsule Take 1 capsule (20 mg total) by mouth daily, Starting Mon 8/3/2020, Normal                 PDMP Review     None          ED Provider  Electronically Signed by           Angel Luis López PA-C  12/11/22 2057       Alicia Lovelace [id number] Eric Torre PA-C  12/14/22 0319

## 2023-12-11 ENCOUNTER — TELEPHONE (OUTPATIENT)
Dept: CARDIOLOGY CLINIC | Facility: CLINIC | Age: 73
End: 2023-12-11

## 2023-12-11 NOTE — TELEPHONE ENCOUNTER
Spoke with Daughter Oliver Lynch explained Mom was on the recall list to see a cardiologist from a year ago, Daughter states mom bp has irma stable and pcp took her off bp medication. She will call if need be.

## 2024-02-07 ENCOUNTER — OFFICE VISIT (OUTPATIENT)
Dept: OBGYN CLINIC | Facility: CLINIC | Age: 74
End: 2024-02-07

## 2024-02-07 VITALS
HEIGHT: 61 IN | RESPIRATION RATE: 18 BRPM | DIASTOLIC BLOOD PRESSURE: 108 MMHG | BODY MASS INDEX: 21.9 KG/M2 | SYSTOLIC BLOOD PRESSURE: 198 MMHG | WEIGHT: 116 LBS | HEART RATE: 102 BPM

## 2024-02-07 DIAGNOSIS — R10.2 PELVIC PAIN: Primary | ICD-10-CM

## 2024-02-07 DIAGNOSIS — R30.0 DYSURIA: ICD-10-CM

## 2024-02-07 PROCEDURE — 99202 OFFICE O/P NEW SF 15 MIN: CPT | Performed by: OBSTETRICS & GYNECOLOGY

## 2024-02-07 NOTE — PROGRESS NOTES
"Assessment/Plan:     No problem-specific Assessment & Plan notes found for this encounter.          Diagnoses and all orders for this visit:    Pelvic pain  -     US pelvis complete non OB; Future    Dysuria  -     Urine culture; Future    RTO in 4 wks for results and follow up.           Subjective:  [id number]     Patient ID: Lacey Shields is a 73 y.o. female who presents with a problem.  She c/o 5 days of pelvic pain left greater than right.  She had a hysterectomy for fibroids,  She denies vaginal bleeding.  She is not sexually active.  She has  associated dysuria. She could not provide a urine specimen as she had just gone to the bathroom.      HPI    The following portions of the patient's history were reviewed and updated as appropriate: allergies, current medications, past family history, past medical history, past social history, past surgical history and problem list.    Review of Systems   Gastrointestinal:  Positive for constipation. Negative for diarrhea.   Genitourinary:  Positive for dysuria and vaginal pain. Negative for vaginal bleeding.         Objective:      BP (!) 198/108 (BP Location: Right arm, Patient Position: Sitting, Cuff Size: Adult)   Pulse 102   Resp 18   Ht 5' 1\" (1.549 m)   Wt 52.6 kg (116 lb)   BMI 21.92 kg/m²          Physical Exam  Vitals and nursing note reviewed. Exam conducted with a chaperone present.   Constitutional:       Appearance: Normal appearance.   Pulmonary:      Effort: Pulmonary effort is normal.   Genitourinary:     Labia:         Right: No rash, tenderness, lesion or injury.         Left: No rash, tenderness, lesion or injury.       Vagina: No signs of injury and foreign body. No vaginal discharge, erythema, tenderness, bleeding, lesions or prolapsed vaginal walls.      Uterus: Absent.       Comments: Vagina is stenotic and short  Pt reports diffuse pain on deep palpation  Neurological:      General: No focal deficit present.      Mental Status: She is alert and " oriented to person, place, and time.   Psychiatric:         Mood and Affect: Mood normal.         Behavior: Behavior normal.

## 2024-02-09 ENCOUNTER — HOSPITAL ENCOUNTER (EMERGENCY)
Facility: HOSPITAL | Age: 74
Discharge: HOME | End: 2024-02-09
Attending: EMERGENCY MEDICINE
Payer: OTHER GOVERNMENT

## 2024-02-09 VITALS
HEIGHT: 61 IN | OXYGEN SATURATION: 100 % | DIASTOLIC BLOOD PRESSURE: 83 MMHG | TEMPERATURE: 97.8 F | RESPIRATION RATE: 20 BRPM | SYSTOLIC BLOOD PRESSURE: 177 MMHG | BODY MASS INDEX: 21.9 KG/M2 | HEART RATE: 110 BPM | WEIGHT: 116 LBS

## 2024-02-09 DIAGNOSIS — R03.0 ELEVATED BLOOD PRESSURE READING: Primary | ICD-10-CM

## 2024-02-09 LAB
ALBUMIN SERPL BCP-MCNC: 4.4 G/DL (ref 3.5–5)
ALP SERPL-CCNC: 92 U/L (ref 34–104)
ALT SERPL W P-5'-P-CCNC: 16 U/L (ref 7–52)
ANION GAP SERPL CALCULATED.3IONS-SCNC: 10 MMOL/L
AST SERPL W P-5'-P-CCNC: 34 U/L (ref 13–39)
BACTERIA UR QL AUTO: NORMAL /HPF
BASOPHILS # BLD AUTO: 0.03 THOUSANDS/ÂΜL (ref 0–0.1)
BASOPHILS NFR BLD AUTO: 0 % (ref 0–1)
BILIRUB SERPL-MCNC: 0.71 MG/DL (ref 0.2–1)
BILIRUB UR QL STRIP: NEGATIVE
BUN SERPL-MCNC: 15 MG/DL (ref 5–25)
CALCIUM SERPL-MCNC: 9.2 MG/DL (ref 8.4–10.2)
CHLORIDE SERPL-SCNC: 102 MMOL/L (ref 96–108)
CLARITY UR: CLEAR
CO2 SERPL-SCNC: 27 MMOL/L (ref 21–32)
COLOR UR: YELLOW
CREAT SERPL-MCNC: 0.72 MG/DL (ref 0.6–1.3)
EOSINOPHIL # BLD AUTO: 0.11 THOUSAND/ÂΜL (ref 0–0.61)
EOSINOPHIL NFR BLD AUTO: 2 % (ref 0–6)
ERYTHROCYTE [DISTWIDTH] IN BLOOD BY AUTOMATED COUNT: 12.2 % (ref 11.6–15.1)
GFR SERPL CREATININE-BSD FRML MDRD: 82 ML/MIN/1.73SQ M
GLUCOSE SERPL-MCNC: 115 MG/DL (ref 65–140)
GLUCOSE UR STRIP-MCNC: NEGATIVE MG/DL
HCT VFR BLD AUTO: 42.9 % (ref 34.8–46.1)
HGB BLD-MCNC: 14 G/DL (ref 11.5–15.4)
HGB UR QL STRIP.AUTO: ABNORMAL
IMM GRANULOCYTES # BLD AUTO: 0.01 THOUSAND/UL (ref 0–0.2)
IMM GRANULOCYTES NFR BLD AUTO: 0 % (ref 0–2)
KETONES UR STRIP-MCNC: NEGATIVE MG/DL
LEUKOCYTE ESTERASE UR QL STRIP: NEGATIVE
LYMPHOCYTES # BLD AUTO: 1.34 THOUSANDS/ÂΜL (ref 0.6–4.47)
LYMPHOCYTES NFR BLD AUTO: 20 % (ref 14–44)
MCH RBC QN AUTO: 29.9 PG (ref 26.8–34.3)
MCHC RBC AUTO-ENTMCNC: 32.6 G/DL (ref 31.4–37.4)
MCV RBC AUTO: 92 FL (ref 82–98)
MONOCYTES # BLD AUTO: 0.41 THOUSAND/ÂΜL (ref 0.17–1.22)
MONOCYTES NFR BLD AUTO: 6 % (ref 4–12)
NEUTROPHILS # BLD AUTO: 4.9 THOUSANDS/ÂΜL (ref 1.85–7.62)
NEUTS SEG NFR BLD AUTO: 72 % (ref 43–75)
NITRITE UR QL STRIP: NEGATIVE
NON-SQ EPI CELLS URNS QL MICRO: NORMAL /HPF
NRBC BLD AUTO-RTO: 0 /100 WBCS
PH UR STRIP.AUTO: 7.5 [PH]
PLATELET # BLD AUTO: 271 THOUSANDS/UL (ref 149–390)
PMV BLD AUTO: 11.1 FL (ref 8.9–12.7)
POTASSIUM SERPL-SCNC: 4.1 MMOL/L (ref 3.5–5.3)
PROT SERPL-MCNC: 7.7 G/DL (ref 6.4–8.4)
PROT UR STRIP-MCNC: NEGATIVE MG/DL
RBC # BLD AUTO: 4.69 MILLION/UL (ref 3.81–5.12)
RBC #/AREA URNS AUTO: NORMAL /HPF
SODIUM SERPL-SCNC: 139 MMOL/L (ref 135–147)
SP GR UR STRIP.AUTO: 1.01 (ref 1–1.03)
UROBILINOGEN UR QL STRIP.AUTO: 0.2 E.U./DL
WBC # BLD AUTO: 6.8 THOUSAND/UL (ref 4.31–10.16)
WBC #/AREA URNS AUTO: NORMAL /HPF

## 2024-02-09 PROCEDURE — 36415 COLL VENOUS BLD VENIPUNCTURE: CPT | Performed by: PHYSICIAN ASSISTANT

## 2024-02-09 PROCEDURE — 99283 EMERGENCY DEPT VISIT LOW MDM: CPT

## 2024-02-09 PROCEDURE — 80053 COMPREHEN METABOLIC PANEL: CPT | Performed by: PHYSICIAN ASSISTANT

## 2024-02-09 PROCEDURE — 81001 URINALYSIS AUTO W/SCOPE: CPT | Performed by: PHYSICIAN ASSISTANT

## 2024-02-09 PROCEDURE — 85025 COMPLETE CBC W/AUTO DIFF WBC: CPT | Performed by: PHYSICIAN ASSISTANT

## 2024-02-09 PROCEDURE — 99284 EMERGENCY DEPT VISIT MOD MDM: CPT | Performed by: PHYSICIAN ASSISTANT

## 2024-02-09 NOTE — DISCHARGE INSTRUCTIONS
Your Blood pressure was elevated during your visit, but last BP was 177/83.  Continue to monitor Blood Pressure daily, around lunch, sitting, relaxed x 2 weeks, record these numbers and follow-up with your doctor.

## 2024-02-09 NOTE — ED PROVIDER NOTES
"History  Chief Complaint   Patient presents with    High Blood Pressure     PT indicated that she was \"feeling bad and my hands were shaking and my blood pressure was high\" Pt stated that she called her PCP yesterday regarding her bp medication but did not receive a call back. Pt is not currently on bp medication. PT c/o \"something hitting my head and a headache and some blurred vision\"      Past Medical History: Allergic, Fibroids, HTN - was taking Amlodipine 2.5mg, but taking off over 1 year ago bc elevated BP improved, they did trial off meds and BP remained stable    Past Surgical History: HYSTERECTOMY, TUBAL LIGATION, UTERINE FIBROID SURGERY      Pt presents to ED c/o shaking in B/L hands and noticed BP has been elevated for past few days, + intermittent generalized CARABALLO.  Pt has been having overactive bladder issues with urinary frequency, which she's going for evaluation for  NO fever, chills, cp, sob, NVD, no rash  Pt admits to living at home alone, starts to think about things, has some anxiety.    Repeat BP during my exam 187/92, HR normal      2022 ED Note: PMH HTN, presenting to ED for high BP, with lightheadedness, HA, hand numbness, heart palpitations, and visual floaters. 180s systolic, visual floaters for approximately 2 years and has had cataract surgery in the past.  She has been on antihypertensive in the past but notes she has not been on them in some time.  The patient denies other complaints at this time.  Hasn't been on meds for over 1 year        Prior to Admission Medications   Prescriptions Last Dose Informant Patient Reported? Taking?   Calcium Carbonate-Vitamin D (CALCIUM 500/D PO)  Self Yes Yes   Sig: Take by mouth   fluticasone (FLONASE) 50 mcg/act nasal spray   No No   Si spray into each nostril daily   multivitamin (THERAGRAN) TABS  Self Yes Yes   Sig: Take 1 tablet by mouth daily   omeprazole (PriLOSEC) 20 mg delayed release capsule Not Taking Self No No   Sig: Take 1 capsule " (20 mg total) by mouth daily   Patient not taking: Reported on 2/9/2024      Facility-Administered Medications: None       Past Medical History:   Diagnosis Date    Allergic     Fibroids     Hypertension     Seasonal allergies        Past Surgical History:   Procedure Laterality Date    HYSTERECTOMY      TUBAL LIGATION      UTERINE FIBROID SURGERY         Family History   Problem Relation Age of Onset    Breast cancer Sister     Prostate cancer Brother     Colon cancer Brother     Cancer Sister      I have reviewed and agree with the history as documented.    E-Cigarette/Vaping    E-Cigarette Use Never User      E-Cigarette/Vaping Substances    Nicotine No     THC No     CBD No     Flavoring No     Other No     Unknown No      Social History     Tobacco Use    Smoking status: Never    Smokeless tobacco: Never   Vaping Use    Vaping status: Never Used   Substance Use Topics    Alcohol use: Never    Drug use: Never       Review of Systems   Constitutional:  Negative for chills and fever.   HENT:  Negative for sore throat.    Respiratory:  Negative for cough and shortness of breath.    Cardiovascular:  Negative for chest pain and leg swelling.   Gastrointestinal:  Negative for abdominal pain, constipation, diarrhea, nausea and vomiting.   Genitourinary:  Positive for frequency and urgency. Negative for difficulty urinating, dysuria, flank pain, hematuria and pelvic pain.   Musculoskeletal:  Negative for arthralgias and myalgias.   Skin:  Negative for color change and pallor.   Neurological:  Positive for tremors and headaches. Negative for dizziness and weakness.   Psychiatric/Behavioral:  Negative for behavioral problems. The patient is nervous/anxious.    All other systems reviewed and are negative.      Physical Exam  Physical Exam  Vitals and nursing note reviewed.   Constitutional:       General: She is not in acute distress.     Appearance: Normal appearance. She is well-developed.   HENT:      Head:  Normocephalic and atraumatic.      Right Ear: External ear normal.      Left Ear: External ear normal.      Nose: Nose normal.      Mouth/Throat:      Mouth: Mucous membranes are moist.      Pharynx: Oropharynx is clear.   Eyes:      Conjunctiva/sclera: Conjunctivae normal.   Cardiovascular:      Rate and Rhythm: Normal rate and regular rhythm.   Pulmonary:      Effort: Pulmonary effort is normal. No respiratory distress.   Abdominal:      General: Bowel sounds are normal.      Palpations: Abdomen is soft.      Tenderness: There is no abdominal tenderness. There is no right CVA tenderness or left CVA tenderness.   Musculoskeletal:         General: Normal range of motion.      Cervical back: Normal range of motion.   Skin:     General: Skin is warm and dry.   Neurological:      General: No focal deficit present.      Mental Status: She is alert and oriented to person, place, and time. Mental status is at baseline.      Gait: Gait normal.   Psychiatric:         Behavior: Behavior normal.      Comments: Slightly anxious         Vital Signs  ED Triage Vitals   Temperature Pulse Respirations Blood Pressure SpO2   02/09/24 1042 02/09/24 1042 02/09/24 1042 02/09/24 1042 02/09/24 1042   97.8 °F (36.6 °C) (!) 110 20 (!) 201/103 100 %      Temp Source Heart Rate Source Patient Position - Orthostatic VS BP Location FiO2 (%)   02/09/24 1042 02/09/24 1042 02/09/24 1042 02/09/24 1042 --   Oral Monitor Sitting Left arm       Pain Score       02/09/24 1046       8           Vitals:    02/09/24 1042 02/09/24 1236   BP: (!) 201/103 (!) 177/83   Pulse: (!) 110    Patient Position - Orthostatic VS: Sitting          Visual Acuity      ED Medications  Medications - No data to display    Diagnostic Studies  Results Reviewed       Procedure Component Value Units Date/Time    Urine Microscopic [[id number]]  (Normal) Collected: 02/09/24 1153    Lab Status: Final result Specimen: Urine, Clean Catch Updated: 02/09/24 1232     RBC, UA 0-1 /hpf       WBC, UA None Seen /hpf      Epithelial Cells Occasional /hpf      Bacteria, UA Occasional /hpf     Comprehensive metabolic panel [[id number]] Collected: 02/09/24 1124    Lab Status: Final result Specimen: Blood from Arm, Left Updated: 02/09/24 1159     Sodium 139 mmol/L      Potassium 4.1 mmol/L      Chloride 102 mmol/L      CO2 27 mmol/L      ANION GAP 10 mmol/L      BUN 15 mg/dL      Creatinine 0.72 mg/dL      Glucose 115 mg/dL      Calcium 9.2 mg/dL      AST 34 U/L      ALT 16 U/L      Alkaline Phosphatase 92 U/L      Total Protein 7.7 g/dL      Albumin 4.4 g/dL      Total Bilirubin 0.71 mg/dL      eGFR 82 ml/min/1.73sq m     Narrative:      National Kid[employer] Foundation guidelines for Chronic Kidney Disease (CKD):     Stage 1 with normal or high GFR (GFR > 90 mL/min/1.73 square meters)    Stage 2 Mild CKD (GFR = 60-89 mL/min/1.73 square meters)    Stage 3A Moderate CKD (GFR = 45-59 mL/min/1.73 square meters)    Stage 3B Moderate CKD (GFR = 30-44 mL/min/1.73 square meters)    Stage 4 Severe CKD (GFR = 15-29 mL/min/1.73 square meters)    Stage 5 End Stage CKD (GFR <15 mL/min/1.73 square meters)  Note: GFR calculation is accurate only with a steady state creatinine    UA w Reflex to Microscopic w Reflex to Culture [[id number]]  (Abnormal) Collected: 02/09/24 1153    Lab Status: Final result Specimen: Urine, Clean Catch Updated: 02/09/24 1159     Color, UA Yellow     Clarity, UA Clear     Specific Gravity, UA 1.015     pH, UA 7.5     Leukocytes, UA Negative     Nitrite, UA Negative     Protein, UA Negative mg/dl      Glucose, UA Negative mg/dl      Ketones, UA Negative mg/dl      Urobilinogen, UA 0.2 E.U./dl      Bilirubin, UA Negative     Occult Blood, UA Trace-Intact    CBC and differential [[id number]] Collected: 02/09/24 1124    Lab Status: Final result Specimen: Blood from Arm, Left Updated: 02/09/24 1128     WBC 6.80 Thousand/uL      RBC 4.69 Million/uL      Hemoglobin 14.0 g/dL      Hematocrit 42.9 %       MCV 92 fL      MCH 29.9 pg      MCHC 32.6 g/dL      RDW 12.2 %      MPV 11.1 fL      Platelets 271 Thousands/uL      nRBC 0 /100 WBCs      Neutrophils Relative 72 %      Immat GRANS % 0 %      Lymphocytes Relative 20 %      Monocytes Relative 6 %      Eosinophils Relative 2 %      Basophils Relative 0 %      Neutrophils Absolute 4.90 Thousands/µL      Immature Grans Absolute 0.01 Thousand/uL      Lymphocytes Absolute 1.34 Thousands/µL      Monocytes Absolute 0.41 Thousand/µL      Eosinophils Absolute 0.11 Thousand/µL      Basophils Absolute 0.03 Thousands/µL                    No orders to display              Procedures  Procedures         ED Course  ED Course as of 02/09/24 1245   Fri Feb 09, 2024   1213 Cbc, cmp unremarkable   1213 UA shows no UTI, no protein, no glucose                                             Medical Decision Making  Pt with sudden onset of elevated bp, had in the past, was on meds, then able to come off.  Well appearing in ED, BP elevated, which came down nicely throughout ED evaluation.  Will check basic labs, which are wnl, no focal neuro deficits.  Stable for dc outpt FU, to monitor BP daily, around lunch, sitting, relaxed x 2 weeks, record and FU with PCP    Amount and/or Complexity of Data Reviewed  Labs: ordered. Decision-making details documented in ED Course.             Disposition  Final diagnoses:   Elevated blood pressure reading     Time reflects when diagnosis was documented in both MDM as applicable and the Disposition within this note       Time User Action Codes Description Comment    2/9/2024 12:42 PM Erin Cramer Add [R03.0] Elevated blood pressure reading           ED Disposition       ED Disposition   Discharge    Condition   Stable    Date/Time   Fri Feb 9, 2024 12:42 PM    Comment   Lacey Shields discharge to home/self care.                   Follow-up Information       Follow up With Specialties Details Why Contact Info    Seda Frost MD Family Medicine   [id number]  [facility] [address]  [phone]              Patient's Medications   Discharge Prescriptions    No medications on file       No discharge procedures on file.    PDMP Review       None            ED Provider  Electronically Signed by             Erin Cramer PA-C  02/09/24 0622

## 2024-02-21 PROBLEM — Z12.11 SCREEN FOR COLON CANCER: Status: RESOLVED | Noted: 2019-12-09 | Resolved: 2024-02-21

## 2024-02-21 PROBLEM — Z01.419 ENCOUNTER FOR GYNECOLOGICAL EXAMINATION WITHOUT ABNORMAL FINDING: Status: RESOLVED | Noted: 2019-12-09 | Resolved: 2024-02-21

## 2024-02-26 ENCOUNTER — HOSPITAL ENCOUNTER (OUTPATIENT)
Dept: ULTRASOUND IMAGING | Facility: HOSPITAL | Age: 74
Discharge: HOME | End: 2024-02-26
Payer: OTHER GOVERNMENT

## 2024-02-26 DIAGNOSIS — R10.2 PELVIC PAIN: ICD-10-CM

## 2024-02-26 PROCEDURE — 76856 US EXAM PELVIC COMPLETE: CPT

## 2024-02-26 PROCEDURE — 76830 TRANSVAGINAL US NON-OB: CPT

## 2024-03-06 ENCOUNTER — OFFICE VISIT (OUTPATIENT)
Dept: OBGYN CLINIC | Facility: CLINIC | Age: 74
End: 2024-03-06

## 2024-03-06 VITALS
DIASTOLIC BLOOD PRESSURE: 84 MMHG | BODY MASS INDEX: 21.9 KG/M2 | HEART RATE: 106 BPM | WEIGHT: 116 LBS | HEIGHT: 61 IN | SYSTOLIC BLOOD PRESSURE: 186 MMHG

## 2024-03-06 DIAGNOSIS — Z71.2 ENCOUNTER TO DISCUSS TEST RESULTS: Primary | ICD-10-CM

## 2024-03-06 PROCEDURE — 99213 OFFICE O/P EST LOW 20 MIN: CPT | Performed by: OBSTETRICS & GYNECOLOGY

## 2024-03-21 ENCOUNTER — OFFICE VISIT (OUTPATIENT)
Dept: FAMILY MEDICINE CLINIC | Facility: CLINIC | Age: 74
End: 2024-03-21
Payer: OTHER GOVERNMENT

## 2024-03-21 VITALS
BODY MASS INDEX: 21.9 KG/M2 | HEART RATE: 98 BPM | SYSTOLIC BLOOD PRESSURE: 154 MMHG | TEMPERATURE: 97.6 F | OXYGEN SATURATION: 99 % | DIASTOLIC BLOOD PRESSURE: 88 MMHG | WEIGHT: 116 LBS | HEIGHT: 61 IN

## 2024-03-21 DIAGNOSIS — I10 PRIMARY HYPERTENSION: Primary | ICD-10-CM

## 2024-03-21 PROCEDURE — 99213 OFFICE O/P EST LOW 20 MIN: CPT | Performed by: FAMILY MEDICINE

## 2024-03-21 NOTE — PATIENT INSTRUCTIONS
[facility]

## 2024-03-21 NOTE — PROGRESS NOTES
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 and blood pressure remaining stable off medications   Since that time, she has had several episodes of elevated BP and HR readings at GYN office & ED  Patient's blood pressure today is acceptable for her age  I suspect anxiety is playing a large role in her elevated blood pressures, as she is experiencing several life stressors at this time with help of her family members, as well as feeling lonely  Discussed ways to reduce stress level; would likely benefit from socializing at [facility]  Will hold off on restarting medication at this time and have patient record daily BP for 2 weeks  Return in 2 weeks to review blood pressure log and determine whether medication is needed  If blood pressure remains elevated will also likely recheck TSH              Subjective:      Patient ID: Lacey Shields is a 73 y.o. female.    HPI    Patient here to follow-up on blood pressure, after having several elevated readings at the GYN office and two ED visits for HTN since last visit here with me. Patient had been on amlodipine in the past but was weaned down and then ultimately discontinued due to symptoms of orthostatism and patient's blood pressure being at goal for her age.  However, she reports intermittent elevated blood pressures at home, which usually occur after she becomes symptomatic of visual floaters, headache, and sometimes tingling/numbness in her hands. Denies chest pain/tightness on exertion, SOB, GRIFFITH, dizziness, lightheadedness, vertigo, syncope, pedal edema. Most recent ED visit was 2/9/24, BP was 201/103, pulse 110, then BP improved to 177/83 without intervention. Labs and UA were unremarkable. She was instructed to check her blood pressures at home and follow-up with PCP.  BP was again elevated 3/6/24 at her  "GYN appointment, /113 and 186/84 with pulse 106.  Patient states when she checks her blood pressure at home it is usually 150s-160s/70s-80s, but it can be higher when she is feeling nervous. Patient does state that she has a lot of anxiety and stress due to many of her family members having cancer and/or passing away. She also lives alone and feels lonely and isolated during the winter months, as she enjoys taking walks outside and getting out in the community but during the winter that does not happen as much.    The following portions of the patient's history were reviewed and updated as appropriate: allergies, current medications, past family history, past medical history, past social history, past surgical history, and problem list.    Review of Systems   Constitutional:  Negative for chills and fever.   Eyes:         Floaters/blurriness when BP elevated   Respiratory:  Negative for chest tightness and shortness of breath.    Cardiovascular:  Negative for chest pain, palpitations and leg swelling.   Gastrointestinal:  Negative for abdominal pain, diarrhea, nausea and vomiting.   Genitourinary:  Negative for dysuria and pelvic pain.        Overactive bladder, following with GYN   Musculoskeletal:  Negative for gait problem.   Neurological:  Negative for syncope, weakness and light-headedness.   Psychiatric/Behavioral:  Negative for agitation, sleep disturbance and suicidal ideas. The patient is nervous/anxious.          Objective:    /88 (BP Location: Left arm, Patient Position: Sitting, Cuff Size: Standard)   Pulse 98   Temp 97.6 °F (36.4 °C) (Tympanic)   Ht 5' 1\" (1.549 m)   Wt 52.6 kg (116 lb)   SpO2 99%   BMI 21.92 kg/m²        Physical Exam  Vitals reviewed.   Constitutional:       General: She is not in acute distress.     Appearance: Normal appearance. She is well-developed and normal weight.   HENT:      Head: Normocephalic and atraumatic.   Cardiovascular:      Rate and Rhythm: Normal " rate and regular rhythm.   Pulmonary:      Effort: Pulmonary effort is normal. No respiratory distress.      Breath sounds: Normal breath sounds.   Abdominal:      General: Bowel sounds are normal. There is no distension.      Palpations: Abdomen is soft.      Tenderness: There is no abdominal tenderness.   Musculoskeletal:         General: Normal range of motion.      Right lower leg: No edema.      Left lower leg: No edema.   Skin:     General: Skin is warm and dry.   Neurological:      Mental Status: She is alert. Mental status is at baseline.   Psychiatric:         Behavior: Behavior normal.         Thought Content: Thought content normal.      Comments: Mildly anxious, becomes tearful when discussing feeling lonely as well as the health issues in her family

## 2024-03-21 NOTE — ASSESSMENT & PLAN NOTE
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 and blood pressure remaining stable off medications   Since that time, she has had several episodes of elevated BP and HR readings at GYN office & ED  Patient's blood pressure today is acceptable for her age  I suspect anxiety is playing a large role in her elevated blood pressures, as she is experiencing several life stressors at this time with help of her family members, as well as feeling lonely  Discussed ways to reduce stress level; would likely benefit from socializing at [facility] center  Will hold off on restarting medication at this time and have patient record daily BP for 2 weeks  Return in 2 weeks to review blood pressure log and determine whether medication is needed  If blood pressure remains elevated will also likely recheck TSH

## 2024-04-11 ENCOUNTER — OFFICE VISIT (OUTPATIENT)
Dept: FAMILY MEDICINE CLINIC | Facility: CLINIC | Age: 74
End: 2024-04-11
Payer: OTHER GOVERNMENT

## 2024-04-11 VITALS
OXYGEN SATURATION: 98 % | DIASTOLIC BLOOD PRESSURE: 78 MMHG | WEIGHT: 116.4 LBS | BODY MASS INDEX: 21.98 KG/M2 | TEMPERATURE: 98.9 F | SYSTOLIC BLOOD PRESSURE: 140 MMHG | HEART RATE: 100 BPM | HEIGHT: 61 IN

## 2024-04-11 DIAGNOSIS — I10 PRIMARY HYPERTENSION: ICD-10-CM

## 2024-04-11 DIAGNOSIS — Z00.00 ANNUAL PHYSICAL EXAM: Primary | ICD-10-CM

## 2024-04-11 DIAGNOSIS — J30.9 ALLERGIC CONJUNCTIVITIS OF BOTH EYES AND RHINITIS: ICD-10-CM

## 2024-04-11 DIAGNOSIS — K21.9 GASTROESOPHAGEAL REFLUX DISEASE, UNSPECIFIED WHETHER ESOPHAGITIS PRESENT: ICD-10-CM

## 2024-04-11 DIAGNOSIS — H10.13 ALLERGIC CONJUNCTIVITIS OF BOTH EYES AND RHINITIS: ICD-10-CM

## 2024-04-11 PROCEDURE — 99397 PER PM REEVAL EST PAT 65+ YR: CPT | Performed by: FAMILY MEDICINE

## 2024-04-11 RX ORDER — OMEPRAZOLE 20 MG/1
20 CAPSULE, DELAYED RELEASE ORAL DAILY
Qty: 90 CAPSULE | Refills: 3 | Status: SHIPPED | OUTPATIENT
Start: 2024-04-11

## 2024-04-11 RX ORDER — OLOPATADINE HYDROCHLORIDE 1 MG/ML
1 SOLUTION/ DROPS OPHTHALMIC 2 TIMES DAILY
Qty: 5 ML | Refills: 3 | Status: SHIPPED | OUTPATIENT
Start: 2024-04-11

## 2024-04-11 NOTE — PATIENT INSTRUCTIONS
Lacey, no necesita medicamentos para tucker presión arterial en anne momento. Creo que tucker presión arterial puede estar fan en momentos en que se siente ansioso, así que intente hacer ejercicios de respiración para ayudar a calmarse si tucker presión arterial es fan. Vuelva en 1 año, o antes si tucker presión arterial aumenta con más frecuencia. Tucker presión arterial objetivo es inferior a 150/90.    Considere ir a un centro para personas mayores de habla hispana para conocer gente y sentirse menos solo, ¡lo que puede ayudar a reducir la ansiedad! Hay vita en Dora que es realmente genial, la información está más abajo.      Wellness Visit for Adults   AMBULATORY CARE:   A wellness visit  is when you see your healthcare provider to get screened for health problems. Your healthcare provider will also give you advice on how to stay healthy. Write down your questions so you remember to ask them. Ask your healthcare provider how often you should have a wellness visit.  What happens at a wellness visit:  Your healthcare provider will ask about your health, and your family history of health problems. This includes high blood pressure, heart disease, and cancer. He or she will ask if you have symptoms that concern you, if you smoke, and about your mood. You may also be asked about your intake of medicines, supplements, food, and alcohol. Any of the following may be done:  Your weight  will be checked. Your height may also be checked so your body mass index (BMI) can be calculated. Your BMI shows if you are at a healthy weight.    Your blood pressure  and heart rate will be checked. Your temperature may also be checked.    Blood and urine tests  may be done. Blood tests may be done to check your cholesterol levels. Abnormal cholesterol levels increase your risk for heart disease and stroke. You may also need a blood or urine test to check for diabetes if you are at increased risk. Urine tests may be done to look for signs of an infection  or kidney disease.    A physical exam  includes checking your heartbeat and lungs with a stethoscope. Your healthcare provider may also check your skin to look for sun damage.    Screening tests  may be recommended. A screening test is done to check for diseases that may not cause symptoms. The screening tests you may need depend on your age, gender, family history, and lifestyle habits. For example, colorectal screening may be recommended if you are 50 years old or older.    Screening tests you need if you are a woman:   A Pap smear  is used to screen for cervical cancer. Pap smears are usually done every 3 to 5 years depending on your age. You may need them more often if you have had abnormal Pap smear test results in the past. Ask your healthcare provider how often you should have a Pap smear.    A mammogram  is an x-ray of your breasts to screen for breast cancer. Experts recommend mammograms every 2 years starting at age 50 years. You may need a mammogram at age 49 years or younger if you have an increased risk for breast cancer. Talk to your healthcare provider about when you should start having mammograms and how often you need them.    Vaccines you may need:   Get an influenza vaccine  every year. The influenza vaccine protects you from the flu. Several types of viruses cause the flu. The viruses change over time, so new vaccines are made each year.    Get a tetanus-diphtheria (Td) booster vaccine  every 10 years. This vaccine protects you against tetanus and diphtheria. Tetanus is a severe infection that may cause painful muscle spasms and lockjaw. Diphtheria is a severe bacterial infection that causes a thick covering in the back of your mouth and throat.    Get a human papillomavirus (HPV) vaccine  if you are female and aged 19 to 26 or male 19 to 21 and never received it. This vaccine protects you from HPV infection. HPV is the most common infection spread by sexual contact. HPV may also cause vaginal,  penile, and anal cancers.    Get a pneumococcal vaccine  if you are aged 65 years or older. The pneumococcal vaccine is an injection given to protect you from pneumococcal disease. Pneumococcal disease is an infection caused by pneumococcal bacteria. The infection may cause pneumonia, meningitis, or an ear infection.    Get a shingles vaccine  if you are 60 or older, even if you have had shingles before. The shingles vaccine is an injection to protect you from the varicella-zoster virus. This is the same virus that causes chickenpox. Shingles is a painful rash that develops in people who had chickenpox or have been exposed to the virus.    How to eat healthy:  My Plate is a model for planning healthy meals. It shows the types and amounts of foods that should go on your plate. Fruits and vegetables make up about half of your plate, and grains and protein make up the other half. A serving of dairy is included on the side of your plate. The amount of calories and serving sizes you need depends on your age, gender, weight, and height. Examples of healthy foods are listed below:  Eat a variety of vegetables  such as dark green, red, and orange vegetables. You can also include canned vegetables low in sodium (salt) and frozen vegetables without added butter or sauces.    Eat a variety of fresh fruits , canned fruit in 100% juice, frozen fruit, and dried fruit.    Include whole grains.  At least half of the grains you eat should be whole grains. Examples include whole-wheat bread, wheat pasta, brown rice, and whole-grain cereals such as oatmeal.    Eat a variety of protein foods such as seafood (fish and shellfish), lean meat, and poultry without skin (turkey and chicken). Examples of lean meats include pork leg, shoulder, or tenderloin, and beef round, sirloin, tenderloin, and extra lean ground beef. Other protein foods include eggs and egg substitutes, beans, peas, soy products, nuts, and seeds.    Choose low-fat dairy  products such as skim or 1% milk or low-fat yogurt, cheese, and cottage cheese.    Limit unhealthy fats  such as butter, hard margarine, and shortening.       Exercise:  Exercise at least 30 minutes per day on most days of the week. Some examples of exercise include walking, biking, dancing, and swimming. You can also fit in more physical activity by taking the stairs instead of the elevator or parking farther away from stores. Include muscle strengthening activities 2 days each week. Regular exercise provides many health benefits. It helps you manage your weight, and decreases your risk for type 2 diabetes, heart disease, stroke, and high blood pressure. Exercise can also help improve your mood. Ask your healthcare provider about the best exercise plan for you.       General health and safety guidelines:   Do not smoke.  Nicotine and other chemicals in cigarettes and cigars can cause lung damage. Ask your healthcare provider for information if you currently smoke and need help to quit. E-cigarettes or smokeless tobacco still contain nicotine. Talk to your healthcare provider before you use these products.    Limit alcohol.  A drink of alcohol is 12 ounces of beer, 5 ounces of wine, or 1½ ounces of liquor.    Lose weight, if needed.  Being overweight increases your risk of certain health conditions. These include heart disease, high blood pressure, type 2 diabetes, and certain types of cancer.    Protect your skin.  Do not sunbathe or use tanning beds. Use sunscreen with a SPF 15 or higher. Apply sunscreen at least 15 minutes before you go outside. Reapply sunscreen every 2 hours. Wear protective clothing, hats, and sunglasses when you are outside.    Drive safely.  Always wear your seatbelt. Make sure everyone in your car wears a seatbelt. A seatbelt can save your life if you are in an accident. Do not use your cell phone when you are driving. This could distract you and cause an accident. Pull over if you need to  make a call or send a text message.    Practice safe sex.  Use latex condoms if are sexually active and have more than one partner. Your healthcare provider may recommend screening tests for sexually transmitted infections (STIs).    Wear helmets, lifejackets, and protective gear.  Always wear a helmet when you ride a bike or motorcycle, go skiing, or play sports that could cause a head injury. Wear protective equipment when you play sports. Wear a lifejacket when you are on a boat or doing water sports.    © Copyright [employer] 2023 Information is for End User's use only and may not be sold, redistributed or otherwise used for commercial purposes.  The above information is an  only. It is not intended as medical advice for individual conditions or treatments. Talk to your doctor, nurse or pharmacist before following any medical regimen to see if it is safe and effective for you.

## 2024-04-11 NOTE — PROGRESS NOTES
ADULT ANNUAL PHYSICAL  [facility]    NAME: Lacey Shields  AGE: 73 y.o. SEX: female  : 1950     DATE: 2024     Assessment and Plan:     Problem List Items Addressed This Visit          Cardiovascular and Mediastinum    Hypertension       Digestive    Gastroesophageal reflux disease    Relevant Medications    omeprazole (PriLOSEC) 20 mg delayed release capsule     Other Visit Diagnoses       Annual physical exam    -  Primary    Allergic conjunctivitis of both eyes and rhinitis        Relevant Medications    olopatadine (PATANOL) 0.1 % ophthalmic solution        Despite some elevated blood pressures at specialist offices, home blood pressure log reveals patient is actually well-controlled without medication.  Advised her to continue to measure intermittently and return in 1 to 2 months, call the office for a sooner follow-up if blood pressure is elevated >150/90.  Refilled Prilosec and gave Pataday drops.    Immunizations and preventive care screenings were discussed with patient today. Appropriate education was printed on patient's after visit summary.    Counseling:  Alcohol/drug use: discussed moderation in alcohol intake, the recommendations for healthy alcohol use, and avoidance of illicit drug use.  Dental Health: discussed importance of regular tooth brushing, flossing, and dental visits.  Injury prevention: discussed safety/seat belts, safety helmets, smoke detectors, carbon dioxide detectors, and smoking near bedding or upholstery.  Sexual health: discussed sexually transmitted diseases, partner selection, use of condoms, avoidance of unintended pregnancy, and contraceptive alternatives.  Exercise: the importance of regular exercise/physical activity was discussed. Recommend exercise 3-5 times per week for at least 30 minutes.           Chief Complaint:     Chief Complaint   Patient presents with    Physical Exam    Hypertension       History of Present Illness:     Adult Annual Physical   Patient here for a comprehensive physical exam. The patient reports she is feeling well, other than some itchy eyes 2/2 allergies when she is outside gardening. Would also like to restart PPI, having some GERD symptoms but denies any red flag symptoms. BP has been well-controlled without medication at home, brought her home BP log:        Diet and Physical Activity  Diet/Nutrition: well balanced diet.   Exercise: walking and moderate cardiovascular exercise.      Depression Screening  PHQ-2/9 Depression Screening    Little interest or pleasure in doing things: 0 - not at all  Feeling down, depressed, or hopeless: 0 - not at all  PHQ-2 Score: 0  PHQ-2 Interpretation: Negative depression screen       General Health  Sleep: sleeps well.   Hearing: normal - bilateral.  Vision: goes for regular eye exams, previous LASIK surgery, and cataract surgery; wears glasses when reading small print  .   Dental: regular dental visits.       /GYN Health  Follows with gynecology? no   Patient is: postmenopausal  Contraceptive method: menopause.    Advanced Care Planning  Is , has one son (lives in [place]) and one daughter; daughter Yris, 38, lives in [facility] and is POA and per patient she has ACP papers      Review of Systems:     Review of Systems   Constitutional:  Negative for chills and fever.   HENT:  Positive for postnasal drip.    Eyes:  Positive for discharge.   Respiratory:  Negative for chest tightness and shortness of breath.    Cardiovascular:  Negative for chest pain, palpitations and leg swelling.   Gastrointestinal:  Negative for abdominal pain, diarrhea, nausea and vomiting.   Genitourinary:  Negative for dysuria.   Musculoskeletal:  Negative for gait problem.   Skin:  Negative for rash.   Neurological:  Negative for syncope, weakness and light-headedness.   Psychiatric/Behavioral:  Negative for agitation, sleep disturbance and suicidal ideas.        Past Medical History:     Past Medical History:   Diagnosis Date    Allergic     Fibroids     Hypertension     Seasonal allergies       Past Surgical History:     Past Surgical History:   Procedure Laterality Date    HYSTERECTOMY      TUBAL LIGATION      UTERINE FIBROID SURGERY        Social History:     Social History     Socioeconomic History    Marital status:      Spouse name: None    Number of children: None    Years of education: None    Highest education level: None   Occupational History    None   Tobacco Use    Smoking status: Never    Smokeless tobacco: Never   Vaping Use    Vaping status: Never Used   Substance and Sexual Activity    Alcohol use: Never    Drug use: Never    Sexual activity: Not Currently   Other Topics Concern    None   Social History Narrative    None     Social Determinants of Health     Financial Resource Strain: Low Risk  (11/23/2020)    Overall Financial Resource Strain (CARDIA)     Difficulty of Paying Living Expenses: Not hard at all   Food Insecurity: No Food Insecurity (11/23/2020)    Hunger Vital Sign     Worried About Running Out of Food in the Last Year: Never true     Ran Out of Food in the Last Year: Never true   Transportation Needs: No Transportation Needs (11/23/2020)    PRAPARE - Transportation     Lack of Transportation (Medical): No     Lack of Transportation (Non-Medical): No   Physical Activity: Sufficiently Active (7/22/2020)    Exercise Vital Sign     Days of Exercise per Week: 4 days     Minutes of Exercise per Session: 60 min   Stress: No Stress Concern Present (7/22/2020)    [employer] - Occupational Stress Questionnaire     Feeling of Stress : Not at all   Social Connections: Socially Isolated (7/22/2020)    Social Connection and Isolation Panel [NHANES]     Frequency of Communication with Friends and Family: More than three times a week     Frequency of Social Gatherings with Friends and Family: More than three times a  "week     Attends Jain Services: Never     Active Member of Clubs or Organizations: No     Attends Club or Organization Meetings: Never     Marital Status:    Intimate Partner Violence: Not At Risk (7/22/2020)    Humiliation, Afraid, Rape, and Kick questionnaire     Fear of Current or Ex-Partner: No     Emotionally Abused: No     Physically Abused: No     Sexually Abused: No   Housing Stability: Not on file      Family History:     Family History   Problem Relation Age of Onset    Breast cancer Sister     Prostate cancer Brother     Colon cancer Brother     Cancer Sister       Current Medications:     Current Outpatient Medications   Medication Sig Dispense Refill    Calcium Carbonate-Vitamin D (CALCIUM 500/D PO) Take by mouth      fluticasone (FLONASE) 50 mcg/act nasal spray 1 spray into each nostril daily 16 g 3    multivitamin (THERAGRAN) TABS Take 1 tablet by mouth daily      omeprazole (PriLOSEC) 20 mg delayed release capsule Take 1 capsule (20 mg total) by mouth daily (Patient not taking: Reported on 2/9/2024) 60 capsule 0     No current facility-administered medications for this visit.      Allergies:     Allergies   Allergen Reactions    Other Nasal Congestion     Seasonal allergies      Physical Exam:     /78 (BP Location: Right arm, Patient Position: Sitting, Cuff Size: Standard)   Pulse 100   Temp 98.9 °F (37.2 °C) (Tympanic)   Ht 5' 1\" (1.549 m)   Wt 52.8 kg (116 lb 6.4 oz)   SpO2 98%   BMI 21.99 kg/m²     Physical Exam  Vitals and nursing note reviewed.   Constitutional:       General: She is not in acute distress.     Appearance: She is well-developed.   HENT:      Head: Normocephalic and atraumatic.      Right Ear: External ear normal.      Left Ear: External ear normal.      Nose: Nose normal.      Mouth/Throat:      Pharynx: No oropharyngeal exudate.   Neck:      Thyroid: No thyromegaly.   Cardiovascular:      Rate and Rhythm: Normal rate and regular rhythm.      Heart sounds: " Normal heart sounds.   Pulmonary:      Effort: Pulmonary effort is normal. No respiratory distress.      Breath sounds: Normal breath sounds.   Abdominal:      General: Bowel sounds are normal. There is no distension.      Palpations: Abdomen is soft.      Tenderness: There is no abdominal tenderness.   Musculoskeletal:         General: Normal range of motion.      Cervical back: Normal range of motion and neck supple.   Skin:     General: Skin is warm and dry.      Findings: No rash.   Neurological:      Mental Status: She is alert and oriented to person, place, and time.   Psychiatric:         Behavior: Behavior normal.          Seda Frost MD  [facility]

## 2024-05-31 NOTE — PROGRESS NOTES
"Adult Annual Physical  Name: Lacey Shields      : 1950      MRN: [id number]  Encounter Provider: Seda Frost MD  Encounter Date: 2024   Encounter department: [facility]    Assessment & Plan   1. Annual physical exam  2. Allergic conjunctivitis of both eyes and rhinitis  -     olopatadine (PATANOL) 0.1 % ophthalmic solution; Administer 1 drop to both eyes 2 (two) times a day  3. Gastroesophageal reflux disease, unspecified whether esophagitis present  -     omeprazole (PriLOSEC) 20 mg delayed release capsule; Take 1 capsule (20 mg total) by mouth daily  4. Primary hypertension    Immunizations and preventive care screenings were discussed with patient today. Appropriate education was printed on patient's after visit summary.    Counseling:  Alcohol/drug use: discussed moderation in alcohol intake, the recommendations for healthy alcohol use, and avoidance of illicit drug use.  Dental Health: discussed importance of regular tooth brushing, flossing, and dental visits.  Injury prevention: discussed safety/seat belts, safety helmets, smoke detectors, carbon dioxide detectors, and smoking near bedding or upholstery.  Sexual health: discussed sexually transmitted diseases, partner selection, use of condoms, avoidance of unintended pregnancy, and contraceptive alternatives.  Exercise: the importance of regular exercise/physical activity was discussed. Recommend exercise 3-5 times per week for at least 30 minutes.          History of Present Illness     Adult Annual Physical  Review of Systems  Medical History Reviewed by provider this encounter:         Objective     /78 (BP Location: Right arm, Patient Position: Sitting, Cuff Size: Standard)   Pulse 100   Temp 98.9 °F (37.2 °C) (Tympanic)   Ht 5' 1\" (1.549 m)   Wt 52.8 kg (116 lb 6.4 oz)   SpO2 98%   BMI 21.99 kg/m²     Physical Exam  Administrative Statements   I have spent a total time of 40 minutes on 24 In caring " for this patient including Instructions for management and Patient and family education.

## 2024-07-22 ENCOUNTER — OFFICE VISIT (OUTPATIENT)
Dept: DENTISTRY | Facility: CLINIC | Age: 74
End: 2024-07-22

## 2024-07-22 VITALS — SYSTOLIC BLOOD PRESSURE: 187 MMHG | DIASTOLIC BLOOD PRESSURE: 108 MMHG | HEART RATE: 91 BPM

## 2024-07-22 DIAGNOSIS — K05.323: Primary | ICD-10-CM

## 2024-07-22 PROCEDURE — D1330 ORAL HYGIENE INSTRUCTIONS: HCPCS

## 2024-07-22 PROCEDURE — D0150 COMPREHENSIVE ORAL EVALUATION - NEW OR ESTABLISHED PATIENT: HCPCS

## 2024-07-22 PROCEDURE — D0210 INTRAORAL - COMPLETE SERIES OF RADIOGRAPHIC IMAGES: HCPCS

## 2024-07-22 NOTE — DENTAL PROCEDURE DETAILS
"   COMP EXAM, FMX, PROBE EXAM   REVIEWED MED HX: meds, allergies, health changes reviewed in EPIC  CHIEF CONCERN:     - \"Came to check my teeth\"  PAIN SCALE:  0  ASA CLASS:  ASA 3 - Patient with moderate systemic disease with functional limitations  PLAQUE:  moderate  CALCULUS: Generalized  BLEEDING:  heavy  STAIN :  Generalized  Heavy  PERIO: Severe chronic generalized periodontitis    Visual and Tactile Intraoral/ Extraoral evaluation: Oral and Oropharyngeal cancer evaluation. No findings     Dr. Russell -  Reviewed with patient clinical and radiographic findings and patient verbalized understanding. All questions and concerns addressed.     REFERRALS: None    CARIES FINDINGS: #13 DO, #14 DO, #30 MODB, #31 MODB       NEXT VISIT: November 2024 for SRPS of all 4 quadrants     "

## 2024-09-18 ENCOUNTER — OFFICE VISIT (OUTPATIENT)
Dept: DENTISTRY | Facility: CLINIC | Age: 74
End: 2024-09-18

## 2024-09-18 VITALS — DIASTOLIC BLOOD PRESSURE: 102 MMHG | SYSTOLIC BLOOD PRESSURE: 190 MMHG

## 2024-09-18 DIAGNOSIS — K05.4 PERIODONTOSIS: Primary | ICD-10-CM

## 2024-09-18 PROCEDURE — D4341 PERIODONTAL SCALING AND ROOT PLANING - 4 OR MORE TEETH PER QUADRANT: HCPCS

## 2024-09-18 NOTE — DENTAL PROCEDURE DETAILS
SCALE AND RP LR     1 carpule/s given-  Oraqix 2.5%   1st BP = 190/102 rt arm / sitting  2nd BP =192/91  Consulted with Dr. Castellon. Dr Castellon recommended no local anes with epi today but rather to use Oraquix. Pt is aware that BP runs high and physician is watching.   CHIEF CONCERN: none   PAIN SCALE: 0  ASA CLASS: ASA 2 - Patient with mild systemic disease with no functional limitations  PLAQUE:moderate  CALCULUS: Moderate  BLEEDING:    light  STAIN : Light    Hand scaled, polished and flossed. Used cavitron    Oral Hygiene Instruction:  recommended brushing 2 x daily for 2 minutes MIN, recommended flossing daily, reviewed dietary precautions. Post op sc/rp instructions placed in AVS, printed and handed/ reviewed with patient.     Soft tissue exam:  soft tissue exam was normal  ExtraOral exam:   Extraoral exam was normal    REFERRALS: no referrals provided         NEXT VISIT:   --->sc/rp UR, sc/rp UL, and sc/rp LL    Last Panorex/ FMX : 7/22/24

## 2024-09-18 NOTE — PROGRESS NOTES
SCALE AND RP UR       1 carpule/s given- {sc/rp anes notes:36189}  1st BP = 190/102 rt arm / sitting  2nd BP =192/91  CHIEF CONCERN: none   PAIN SCALE: {PAIN SCALE NUMBERS:75987}  ASA CLASS: {ASA GRADE:969050}  PLAQUE:{Plaque Level:11104}  CALCULUS: {Calculus:31708}  BLEEDING:    {Bleedin}  STAIN : {Stain:}  PERIO:     Hand scaled, polished and flossed. Used cavitron    Oral Hygiene Instruction:  recommended brushing 2 x daily for 2 minutes MIN, recommended flossing daily, reviewed dietary precautions. Post op sc/rp instructions placed in AVS, printed and handed/ reviewed with patient.     Soft tissue exam:  soft tissue exam was normal  ExtraOral exam:   Extraoral exam was normal    REFERRALS: no referrals provided         NEXT VISIT:   --->{NV SC/RP:55224}    Last BWX:   Last Panorex/ FMX :

## 2024-09-18 NOTE — PATIENT INSTRUCTIONS
Instrucciones posoperatorias de raspado + alisado radicular    Le realizaron un procedimiento llamado raspado + alisado radicular (SRP, por jimmy siglas en inglés). Anne es un tratamiento no quirúrgico de la periodontitis (enfermedad de las encías). El propósito de anne tratamiento es eliminar la placa bacteriana y el sarro de la superficie de la raíz debajo de la línea de las encías, ya que pueden causar problemas de jovani graves, incluida la pérdida ósea. El objetivo de anne tratamiento es permitir la reinserción de las encías a la superficie limpia de la raíz y reducir las bolsas periodontales a niveles que se puedan mantener con el uso diario de hilo dental y con el cepillado.  Seguimiento del procedimiento  Cuando se usa anestesia, los labios, dientes y lengua pueden permanecer entumecidos después del procedimiento. El tiempo que están entumecidos varía  según el tipo de anestesia recibida. Tenga cuidado con la lengua y los labios. Es fácil morderse o quemarse la lengua o los labios mientras están entumecidos. No coma ni kade nada que esté muy caliente. Sería conveniente que se abstuviera de comer o beber algo hasta que el entumecimiento haya desaparecido.  Malestar o dolor  Es normal que alonzo unos días sienta dolor en el tejido de las encías. Puede  keith un analgésico de venta dyllan pelon Advil o Aleve si tiene sensibilidad en las encías. Si debe evitar estos analgésicos porque ya está tomando fármacos antiinflamatorios no esteroideos (NSAID, por jimmy siglas en inglés), es alérgico a ellos o tiene úlceras, entonces puede keith acetaminofén (Tylenol). Siga las recomendaciones de dosificación de las etiquetas de los productos.  Sensibilidad en los dientes  Es normal que los dientes estén más sensibles a las temperaturas frías o calientes o a los dulces. [place] ocurre a medida que el tejido de las encías se jonna y se reduce de tamaño. Esta sensibilidad es la queja más frecuente después del raspado y alisado  radicular. Toda sensibilidad debería desaparecer de a poco en unas pocas semanas. Cepillarse los dientes todos los días con pasta dental para la sensibilidad o usar enjuagues con flúor puede ayudar a aliviar esto con el tiempo.  Evite fumar/consumir productos de tabaco  Se debe evitar fumar y consumir productos de tabaco por un mínimo de 24 a 48 horas. El consumo de tabaco retrasa el proceso de curación y aumenta la posibilidad de tener complicaciones posoperatorias. Se recomienda dejar de fumar.  Sangrado  El sangrado leve puede continuar alonzo 24 a 48 horas. [place] es normal y debería disminuir. Puede volver al trabajo, alf se recomienda limitar la actividad extenuante alonzo las 24 horas después del procedimiento, ya que esto puede aumentar el sangrado. Alozno el alexander del día, evite enjuagarse la boca enérgicamente, ya que esto prolongará el sangrado. Si el sangrado continúa, ejerza law leve presión en la herlinda con law gasa humedecida o law bolsita de té humedecida. Déjela en el lugar por 20 o 30 minutos. Si aún le preocupa el sangrado después de seguir estas instrucciones, comuníquese con nuestro consultorio.  Comida/alimentación  Puede comer según lo tolere después de que se le haya león el entumecimiento. [place] próxima comida debe consistir de alimentos blandos (pasta, huevos revueltos, puré de epi, macarrones con queso, etc.). Evite las comidas duras y crujientes pelon epi fritas, palomitas de maíz, nueces o semillas, bebidas  alcohólicas y líquidos calientes por hoy.  Siga con el cuidado en tucker casa/las visitas de seguimiento  Reanude tucker rutina de cuidado de la jovani bucal en tucker casa cepillándose los dientes 2 veces al día y usando hilo dental 1 vez al día, incluso si tiene sangrado o sensibilidad en las encías. La higiene dental adecuada en tucker casa y las visitas de rutina a nuestro consultorio dental son las formas más importantes con las que puede mantener la jovani de las encías después del raspado y  alisado radicular. Es overton enjuagar la boca con agua tibia con sal (1/4 de cucharadita de sal en un vaso lleno de agua convenientemente muy tibia) o con un enjuague bucal antimicrobiano después del procedimiento. Después del tratamiento, las encías deberían verse más rosadas, estar menos hinchadas y sangrar menos. Estas son señales de curación y mejora de la jovani periodontal. Se recomiendan limpiezas de mantenimiento de 3 a 4 meses para controlar tucker progreso y tratar la enfermedad periodontal.

## 2024-10-01 ENCOUNTER — OFFICE VISIT (OUTPATIENT)
Dept: DENTISTRY | Facility: CLINIC | Age: 74
End: 2024-10-01

## 2024-10-01 VITALS — HEART RATE: 96 BPM | SYSTOLIC BLOOD PRESSURE: 201 MMHG | DIASTOLIC BLOOD PRESSURE: 92 MMHG

## 2024-10-01 DIAGNOSIS — K05.4 PERIODONTOSIS: Primary | ICD-10-CM

## 2024-10-01 PROCEDURE — D4341 PERIODONTAL SCALING AND ROOT PLANING - 4 OR MORE TEETH PER QUADRANT: HCPCS

## 2024-10-01 NOTE — PROGRESS NOTES
SCALE AND RP {sc/rp:22216}   Local anesthesia administered by {Exam:30970}       {# carps given:40003} carpule/s given- {sc/rp anes notes:96828}  CHIEF CONCERN: none   PAIN SCALE: 0  ASA CLASS: ASA 1 - Normal health patient  PLAQUE:{Plaque Level:95726}  CALCULUS: {Calculus:97193}  BLEEDING:    {Bleedin}  STAIN : {Stain:}  PERIO:     Hand scaled, polished and flossed. Used cavitron    Oral Hygiene Instruction:  recommended brushing 2 x daily for 2 minutes MIN, recommended flossing daily, reviewed dietary precautions. Post op sc/rp instructions placed in AVS, printed and handed/ reviewed with patient.     Soft tissue exam:  soft tissue exam was normal  ExtraOral exam:   Extraoral exam was normal    REFERRALS: no referrals provided         NEXT VISIT:   --->sc/rp UR    Last Panorex/ FMX : 24

## 2024-10-01 NOTE — DENTAL PROCEDURE DETAILS
SCALE AND RP UL and LL   1 carpule/s given-  Oraqix 2.5%   1st BP reading again high= 201/92  Dr. Castellon consulted with patient.pt reports had appt with  and they stated its just emotional and no meds prescribed. Pt states she takes BP at home and its not high.  Will only use Oraquix today per    2nd BP reading = 186/95  CHIEF CONCERN: none   PAIN SCALE: 0  ASA CLASS: ASA 1 - Normal health patient  PLAQUE:mild  CALCULUS: Moderate  BLEEDING:    moderate  STAIN : Moderate    Hand scaled, polished and flossed. Used cavitron    Oral Hygiene Instruction:  recommended brushing 2 x daily for 2 minutes MIN, recommended flossing daily, reviewed dietary precautions. Post op sc/rp instructions placed in AVS, printed and handed/ reviewed with patient.     Soft tissue exam:  soft tissue exam was normal  ExtraOral exam:   Extraoral exam was normal    REFERRALS: no referrals provided         NEXT VISIT:   --->sc/rp UR    Last Panorex/ FMX : 7/22/24

## 2024-10-01 NOTE — PATIENT INSTRUCTIONS
Instrucciones posoperatorias de raspado + alisado radicular    Le realizaron un procedimiento llamado raspado + alisado radicular (SRP, por jimmy siglas en inglés). Anne es un tratamiento no quirúrgico de la periodontitis (enfermedad de las encías). El propósito de anne tratamiento es eliminar la placa bacteriana y el sarro de la superficie de la raíz debajo de la línea de las encías, ya que pueden causar problemas de jovani graves, incluida la pérdida ósea. El objetivo de anne tratamiento es permitir la reinserción de las encías a la superficie limpia de la raíz y reducir las bolsas periodontales a niveles que se puedan mantener con el uso diario de hilo dental y con el cepillado.  Seguimiento del procedimiento  Cuando se usa anestesia, los labios, dientes y lengua pueden permanecer entumecidos después del procedimiento. El tiempo que están entumecidos varía  según el tipo de anestesia recibida. Tenga cuidado con la lengua y los labios. Es fácil morderse o quemarse la lengua o los labios mientras están entumecidos. No coma ni kade nada que esté muy caliente. Sería conveniente que se abstuviera de comer o beber algo hasta que el entumecimiento haya desaparecido.  Malestar o dolor  Es normal que alonzo unos días sienta dolor en el tejido de las encías. Puede  keith un analgésico de venta dyllan pelon Advil o Aleve si tiene sensibilidad en las encías. Si debe evitar estos analgésicos porque ya está tomando fármacos antiinflamatorios no esteroideos (NSAID, por jimmy siglas en inglés), es alérgico a ellos o tiene úlceras, entonces puede keith acetaminofén (Tylenol). Siga las recomendaciones de dosificación de las etiquetas de los productos.  Sensibilidad en los dientes  Es normal que los dientes estén más sensibles a las temperaturas frías o calientes o a los dulces. [place] ocurre a medida que el tejido de las encías se jonna y se reduce de tamaño. Esta sensibilidad es la queja más frecuente después del raspado y alisado  radicular. Toda sensibilidad debería desaparecer de a poco en unas pocas semanas. Cepillarse los dientes todos los días con pasta dental para la sensibilidad o usar enjuagues con flúor puede ayudar a aliviar esto con el tiempo.  Evite fumar/consumir productos de tabaco  Se debe evitar fumar y consumir productos de tabaco por un mínimo de 24 a 48 horas. El consumo de tabaco retrasa el proceso de curación y aumenta la posibilidad de tener complicaciones posoperatorias. Se recomienda dejar de fumar.  Sangrado  El sangrado leve puede continuar alonzo 24 a 48 horas. [place] es normal y debería disminuir. Puede volver al trabajo, alf se recomienda limitar la actividad extenuante alonzo las 24 horas después del procedimiento, ya que esto puede aumentar el sangrado. Alonzo el alexander del día, evite enjuagarse la boca enérgicamente, ya que esto prolongará el sangrado. Si el sangrado continúa, ejerza law leve presión en la herlinda con law gasa humedecida o law bolsita de té humedecida. Déjela en el lugar por 20 o 30 minutos. Si aún le preocupa el sangrado después de seguir estas instrucciones, comuníquese con nuestro consultorio.  Comida/alimentación  Puede comer según lo tolere después de que se le haya león el entumecimiento. [place] próxima comida debe consistir de alimentos blandos (pasta, huevos revueltos, puré de epi, macarrones con queso, etc.). Evite las comidas duras y crujientes pelon epi fritas, palomitas de maíz, nueces o semillas, bebidas  alcohólicas y líquidos calientes por hoy.  Siga con el cuidado en tucker casa/las visitas de seguimiento  Reanude tucker rutina de cuidado de la jovani bucal en tucker casa cepillándose los dientes 2 veces al día y usando hilo dental 1 vez al día, incluso si tiene sangrado o sensibilidad en las encías. La higiene dental adecuada en tucker casa y las visitas de rutina a nuestro consultorio dental son las formas más importantes con las que puede mantener la jovani de las encías después del raspado y  alisado radicular. Es overton enjuagar la boca con agua tibia con sal (1/4 de cucharadita de sal en un vaso lleno de agua convenientemente muy tibia) o con un enjuague bucal antimicrobiano después del procedimiento. Después del tratamiento, las encías deberían verse más rosadas, estar menos hinchadas y sangrar menos. Estas son señales de curación y mejora de la jovani periodontal. Se recomiendan limpiezas de mantenimiento de 3 a 4 meses para controlar tucker progreso y tratar la enfermedad periodontal.

## 2024-10-02 ENCOUNTER — OFFICE VISIT (OUTPATIENT)
Dept: DENTISTRY | Facility: CLINIC | Age: 74
End: 2024-10-02

## 2024-10-02 VITALS — SYSTOLIC BLOOD PRESSURE: 169 MMHG | DIASTOLIC BLOOD PRESSURE: 95 MMHG | HEART RATE: 91 BPM

## 2024-10-02 DIAGNOSIS — K05.6 PERIODONTAL DISEASE: Primary | ICD-10-CM

## 2024-10-02 PROCEDURE — D4341 PERIODONTAL SCALING AND ROOT PLANING - 4 OR MORE TEETH PER QUADRANT: HCPCS

## 2024-10-02 NOTE — PROGRESS NOTES
SCALE AND RP UR    1 carpule/s given-  Oraqix 2.5%   CHIEF CONCERN: none   PAIN SCALE: 0  ASA CLASS: ASA 1 - Normal health patient  PLAQUE:{Plaque Level:18320}  CALCULUS: {Calculus:22463}   BLEEDING:    {Bleedin}  STAIN : {Stain:}  PERIO:     Hand scaled, polished and flossed. Used cavitron    Oral Hygiene Instruction:  recommended brushing 2 x daily for 2 minutes MIN, recommended flossing daily, reviewed dietary precautions. Post op sc/rp instructions placed in AVS, printed and handed/ reviewed with patient.     Soft tissue exam:  soft tissue exam was normal  ExtraOral exam:   Extraoral exam was normal    REFERRALS: no referrals provided         NEXT VISIT:   --->{NV SC/RP:65376}    Last BWX:   Last Panorex/ FMX :

## 2024-10-02 NOTE — DENTAL PROCEDURE DETAILS
SCALE AND RP UR    1 carpule/s given-  Oraqix 2.5%   CHIEF CONCERN: none   PAIN SCALE: 0  ASA CLASS: ASA 1 - Normal health patient  PLAQUE:mild  CALCULUS: Light   BLEEDING:    moderate  STAIN : None    Hand scaled, polished and flossed. Used cavitron    Oral Hygiene Instruction:  recommended brushing 2 x daily for 2 minutes MIN, recommended flossing daily, reviewed dietary precautions. Post op sc/rp instructions placed in AVS, printed and handed/ reviewed with patient.     Soft tissue exam:  soft tissue exam was normal  ExtraOral exam:   Extraoral exam was normal    REFERRALS: no referrals provided         NEXT VISIT:   --->3 mth perio maintenance  ---> fillings    Last Panorex/ FMX : 7/22/24    General Sunscreen Counseling: I recommended a broad spectrum sunscreen with an SPF of 30 or higher.  I explained that SPF 30 sunscreens block approximately 97 percent of the sun's harmful rays.  Sunscreens should be applied at least 15 minutes prior to expected sun exposure and then every 2 hours after that as long as sun exposure continues. If swimming or exercising sunscreen should be reapplied every 45 minutes to an hour after getting wet or sweating.  One ounce, or the equivalent of a shot glass full of sunscreen, is adequate to protect the skin not covered by a bathing suit. I also recommended a lip balm with an SPF 15 or higher sunscreen as well. Sun protective clothing can be used in lieu of sunscreen but must be worn the entire time you are exposed to the sun's rays. Detail Level: Detailed

## 2024-10-02 NOTE — PATIENT INSTRUCTIONS
Instrucciones posoperatorias de raspado + alisado radicular    Le realizaron un procedimiento llamado raspado + alisado radicular (SRP, por jimmy siglas en inglés). Anne es un tratamiento no quirúrgico de la periodontitis (enfermedad de las encías). El propósito de anne tratamiento es eliminar la placa bacteriana y el sarro de la superficie de la raíz debajo de la línea de las encías, ya que pueden causar problemas de jovani graves, incluida la pérdida ósea. El objetivo de anne tratamiento es permitir la reinserción de las encías a la superficie limpia de la raíz y reducir las bolsas periodontales a niveles que se puedan mantener con el uso diario de hilo dental y con el cepillado.  Seguimiento del procedimiento  Cuando se usa anestesia, los labios, dientes y lengua pueden permanecer entumecidos después del procedimiento. El tiempo que están entumecidos varía  según el tipo de anestesia recibida. Tenga cuidado con la lengua y los labios. Es fácil morderse o quemarse la lengua o los labios mientras están entumecidos. No coma ni kade nada que esté muy caliente. Sería conveniente que se abstuviera de comer o beber algo hasta que el entumecimiento haya desaparecido.  Malestar o dolor  Es normal que alonzo unos días sienta dolor en el tejido de las encías. Puede  keith un analgésico de venta dyllan pelon Advil o Aleve si tiene sensibilidad en las encías. Si debe evitar estos analgésicos porque ya está tomando fármacos antiinflamatorios no esteroideos (NSAID, por jimmy siglas en inglés), es alérgico a ellos o tiene úlceras, entonces puede keith acetaminofén (Tylenol). Siga las recomendaciones de dosificación de las etiquetas de los productos.  Sensibilidad en los dientes  Es normal que los dientes estén más sensibles a las temperaturas frías o calientes o a los dulces. [place] ocurre a medida que el tejido de las encías se jonna y se reduce de tamaño. Esta sensibilidad es la queja más frecuente después del raspado y alisado  radicular. Toda sensibilidad debería desaparecer de a poco en unas pocas semanas. Cepillarse los dientes todos los días con pasta dental para la sensibilidad o usar enjuagues con flúor puede ayudar a aliviar esto con el tiempo.  Evite fumar/consumir productos de tabaco  Se debe evitar fumar y consumir productos de tabaco por un mínimo de 24 a 48 horas. El consumo de tabaco retrasa el proceso de curación y aumenta la posibilidad de tener complicaciones posoperatorias. Se recomienda dejar de fumar.  Sangrado  El sangrado leve puede continuar alonzo 24 a 48 horas. [place] es normal y debería disminuir. Puede volver al trabajo, alf se recomienda limitar la actividad extenuante alonzo las 24 horas después del procedimiento, ya que esto puede aumentar el sangrado. Alonzo el alexander del día, evite enjuagarse la boca enérgicamente, ya que esto prolongará el sangrado. Si el sangrado continúa, ejerza law leve presión en la herlinda con law gasa humedecida o law bolsita de té humedecida. Déjela en el lugar por 20 o 30 minutos. Si aún le preocupa el sangrado después de seguir estas instrucciones, comuníquese con nuestro consultorio.  Comida/alimentación  Puede comer según lo tolere después de que se le haya león el entumecimiento. [place] próxima comida debe consistir de alimentos blandos (pasta, huevos revueltos, puré de epi, macarrones con queso, etc.). Evite las comidas duras y crujientes pelon epi fritas, palomitas de maíz, nueces o semillas, bebidas  alcohólicas y líquidos calientes por hoy.  Siga con el cuidado en tucker casa/las visitas de seguimiento  Reanude tucker rutina de cuidado de la jovani bucal en tucker casa cepillándose los dientes 2 veces al día y usando hilo dental 1 vez al día, incluso si tiene sangrado o sensibilidad en las encías. La higiene dental adecuada en tucker casa y las visitas de rutina a nuestro consultorio dental son las formas más importantes con las que puede mantener la jovani de las encías después del raspado y  alisado radicular. Es overton enjuagar la boca con agua tibia con sal (1/4 de cucharadita de sal en un vaso lleno de agua convenientemente muy tibia) o con un enjuague bucal antimicrobiano después del procedimiento. Después del tratamiento, las encías deberían verse más rosadas, estar menos hinchadas y sangrar menos. Estas son señales de curación y mejora de la jovani periodontal. Se recomiendan limpiezas de mantenimiento de 3 a 4 meses para controlar tucker progreso y tratar la enfermedad periodontal.

## 2024-10-09 ENCOUNTER — OFFICE VISIT (OUTPATIENT)
Dept: DENTISTRY | Facility: CLINIC | Age: 74
End: 2024-10-09

## 2024-10-09 VITALS — DIASTOLIC BLOOD PRESSURE: 89 MMHG | HEART RATE: 106 BPM | SYSTOLIC BLOOD PRESSURE: 183 MMHG

## 2024-10-09 DIAGNOSIS — K02.62 DENTAL CARIES EXTENDING INTO DENTIN: Primary | ICD-10-CM

## 2024-10-09 PROCEDURE — D2391 RESIN-BASED COMPOSITE - 1 SURFACE, POSTERIOR: HCPCS | Performed by: DENTIST

## 2024-10-09 NOTE — DENTAL PROCEDURE DETAILS
Patient presents for a dental restoration and verbally consents for treatment:  Reviewed health history-  Pt is ASA type III  Treatment consents signed: Yes  Perio: Periodontitis   Pain Scale:1  Caries Assessment: high  Radiographs: Films are current  Oral Hygiene instruction reviewed and given  Hygiene recall visits recommended to the patient      Patient agrees with the diagnosis of Caries and the proposed treatment plan for the resin restoration:  Tooth #30-B (v)  Discussed with patient need for RCT if pulp exposure occurs or in the future if pulp is inflamed. Pt understands and consents.    Dental Anesthesia: 0.5 Carpule 4% Septocaine 1:100K epi infiltrations.  Excavated caries with high speed and round bur on slow speed.  Material:  Selective etch and rinsed. Ivoclar ayala placed and EvoFlow resin placed and cured.  Shade: A2  Polished with finishing burs and Enhance. Occlusion adjusted and contact good and adequate. Explained to pt that tooth has a very large build up and tooth would eventually need a crown to prevent fracture.     Also #31 has class III mobility due to bone loss, recommended extraction of #31 but pt disclosed it doesn't hurt and wants to keep for as long as possible.  Gave post op instructions to avoid chewing till numbness goes away.    All questions answered. Pt left satisfied and in good health.    Prognosis is Good.   Referrals Needed: No      Next visit: adonis Bliss

## 2025-05-14 ENCOUNTER — HOSPITAL ENCOUNTER (EMERGENCY)
Facility: HOSPITAL | Age: 75
Discharge: HOME | End: 2025-05-14
Attending: EMERGENCY MEDICINE
Payer: OTHER GOVERNMENT

## 2025-05-14 ENCOUNTER — OFFICE VISIT (OUTPATIENT)
Dept: DENTISTRY | Facility: CLINIC | Age: 75
End: 2025-05-14

## 2025-05-14 VITALS
DIASTOLIC BLOOD PRESSURE: 88 MMHG | SYSTOLIC BLOOD PRESSURE: 169 MMHG | OXYGEN SATURATION: 97 % | RESPIRATION RATE: 18 BRPM | TEMPERATURE: 98 F | HEART RATE: 85 BPM

## 2025-05-14 VITALS — DIASTOLIC BLOOD PRESSURE: 88 MMHG | HEART RATE: 106 BPM | SYSTOLIC BLOOD PRESSURE: 209 MMHG

## 2025-05-14 DIAGNOSIS — R03.0 ELEVATED BLOOD PRESSURE READING: Primary | ICD-10-CM

## 2025-05-14 DIAGNOSIS — Z01.21 ENCOUNTER FOR DENTAL EXAMINATION AND CLEANING WITH ABNORMAL FINDINGS: ICD-10-CM

## 2025-05-14 DIAGNOSIS — I10 HYPERTENSION, UNSPECIFIED TYPE: Primary | ICD-10-CM

## 2025-05-14 LAB
ALBUMIN SERPL BCG-MCNC: 4.3 G/DL (ref 3.5–5)
ALP SERPL-CCNC: 88 U/L (ref 34–104)
ALT SERPL W P-5'-P-CCNC: 14 U/L (ref 7–52)
ANION GAP SERPL CALCULATED.3IONS-SCNC: 9 MMOL/L (ref 4–13)
AST SERPL W P-5'-P-CCNC: 20 U/L (ref 13–39)
BASOPHILS # BLD AUTO: 0.02 THOUSANDS/ÂΜL (ref 0–0.1)
BASOPHILS NFR BLD AUTO: 0 % (ref 0–1)
BILIRUB SERPL-MCNC: 0.62 MG/DL (ref 0.2–1)
BUN SERPL-MCNC: 16 MG/DL (ref 5–25)
CALCIUM SERPL-MCNC: 8.8 MG/DL (ref 8.4–10.2)
CARDIAC TROPONIN I PNL SERPL HS: 4 NG/L (ref ?–50)
CHLORIDE SERPL-SCNC: 103 MMOL/L (ref 96–108)
CO2 SERPL-SCNC: 27 MMOL/L (ref 21–32)
CREAT SERPL-MCNC: 0.72 MG/DL (ref 0.6–1.3)
EOSINOPHIL # BLD AUTO: 0.07 THOUSAND/ÂΜL (ref 0–0.61)
EOSINOPHIL NFR BLD AUTO: 1 % (ref 0–6)
ERYTHROCYTE [DISTWIDTH] IN BLOOD BY AUTOMATED COUNT: 12 % (ref 11.6–15.1)
GFR SERPL CREATININE-BSD FRML MDRD: 81 ML/MIN/1.73SQ M
GLUCOSE SERPL-MCNC: 114 MG/DL (ref 65–140)
HCT VFR BLD AUTO: 40.6 % (ref 34.8–46.1)
HGB BLD-MCNC: 13.4 G/DL (ref 11.5–15.4)
IMM GRANULOCYTES # BLD AUTO: 0.01 THOUSAND/UL (ref 0–0.2)
IMM GRANULOCYTES NFR BLD AUTO: 0 % (ref 0–2)
LYMPHOCYTES # BLD AUTO: 0.83 THOUSANDS/ÂΜL (ref 0.6–4.47)
LYMPHOCYTES NFR BLD AUTO: 11 % (ref 14–44)
MCH RBC QN AUTO: 29.6 PG (ref 26.8–34.3)
MCHC RBC AUTO-ENTMCNC: 33 G/DL (ref 31.4–37.4)
MCV RBC AUTO: 90 FL (ref 82–98)
MONOCYTES # BLD AUTO: 0.39 THOUSAND/ÂΜL (ref 0.17–1.22)
MONOCYTES NFR BLD AUTO: 5 % (ref 4–12)
NEUTROPHILS # BLD AUTO: 6.56 THOUSANDS/ÂΜL (ref 1.85–7.62)
NEUTS SEG NFR BLD AUTO: 83 % (ref 43–75)
NRBC BLD AUTO-RTO: 0 /100 WBCS
PLATELET # BLD AUTO: 276 THOUSANDS/UL (ref 149–390)
PMV BLD AUTO: 10.5 FL (ref 8.9–12.7)
POTASSIUM SERPL-SCNC: 3.6 MMOL/L (ref 3.5–5.3)
PROT SERPL-MCNC: 7.1 G/DL (ref 6.4–8.4)
RBC # BLD AUTO: 4.52 MILLION/UL (ref 3.81–5.12)
SODIUM SERPL-SCNC: 139 MMOL/L (ref 135–147)
WBC # BLD AUTO: 7.88 THOUSAND/UL (ref 4.31–10.16)

## 2025-05-14 PROCEDURE — 36415 COLL VENOUS BLD VENIPUNCTURE: CPT | Performed by: EMERGENCY MEDICINE

## 2025-05-14 PROCEDURE — 99283 EMERGENCY DEPT VISIT LOW MDM: CPT

## 2025-05-14 PROCEDURE — 85025 COMPLETE CBC W/AUTO DIFF WBC: CPT | Performed by: EMERGENCY MEDICINE

## 2025-05-14 PROCEDURE — 93005 ELECTROCARDIOGRAM TRACING: CPT

## 2025-05-14 PROCEDURE — D0120 PERIODIC ORAL EVALUATION - ESTABLISHED PATIENT: HCPCS

## 2025-05-14 PROCEDURE — 80053 COMPREHEN METABOLIC PANEL: CPT | Performed by: EMERGENCY MEDICINE

## 2025-05-14 PROCEDURE — 84484 ASSAY OF TROPONIN QUANT: CPT | Performed by: EMERGENCY MEDICINE

## 2025-05-14 PROCEDURE — 99284 EMERGENCY DEPT VISIT MOD MDM: CPT | Performed by: EMERGENCY MEDICINE

## 2025-05-14 NOTE — PROGRESS NOTES
Procedure Details   - PERIODIC ORAL EVALUATION - ESTABLISHED PATIENT  Periodic exam only  Pt arrived at [facility] for first perio maint apt. Since Srp completed 10/2024. Pt did not want cleaning, complaining of sensitivity all over mouth and bleeding so much last apt. We explained to pt. She has periodontal disease and it is recommended she maintains every 3 mos. Pt. Was due 4 months ago.   Bp taken three times on two different arms due to high readings. Pt stated primary care dr had stopped HBP med. We recommended follow up with MD.   DR Valentine examined: no new decay but does still need trt  Pt satisfied with today's apt.    NV: pt requested numbing, need 50 mins perio maint   Bws due 7/22/25  Ext. Still needed #31 and crown #13 (recurrent decay present)  Referral sent in for MD

## 2025-05-14 NOTE — ED PROVIDER NOTES
Time reflects when diagnosis was documented in both MDM as applicable and the Disposition within this note       Time User Action Codes Description Comment    5/14/2025  1:15 PM Patito Alves Add [R03.0] Elevated blood pressure reading           ED Disposition       ED Disposition   Discharge    Condition   Stable    Date/Time   Wed May 14, 2025  1:15 PM    Comment   Lacey Shields discharge to home/self care.                   Assessment & Plan       Medical Decision Making  75-year-old female presenting with complaint of high blood pressure noticed at the dentist this morning.  Patient states that her blood pressure readings are usually normal at home but high when she is at a doctor's appointment.  She used to be on blood pressure medicine but was taken off due to orthostasis.  She reports intermittent headaches and some blurry vision when she woke up this morning which is resolved.  She is currently feeling well.  She is hypertensive mildly tachycardic with otherwise normal vitals on arrival.  Normal cardiopulmonary exam.  Speaking comfortably.  No leg swelling.  Screening labs notable for normal CBC and BMP and negative troponin.  EKG showing sinus tachycardia 103 bpm with no acute ST or T wave abnormalities per my independent interpretation.  Patient stable for discharge advised to keep a blood pressure log at home and follow-up with her primary care doctor.    Amount and/or Complexity of Data Reviewed  Labs: ordered.             Medications - No data to display    ED Risk Strat Scores                    No data recorded                            History of Present Illness       Chief Complaint   Patient presents with    Hypertension     Pt reports high BP while at the dentist this morning, c/o HA,blurry vision for a few days. Denies taking medications for her hypertension.        Past Medical History:   Diagnosis Date    Allergic     Fibroids     Hypertension     Seasonal allergies       Past Surgical  History:   Procedure Laterality Date    HYSTERECTOMY      TUBAL LIGATION      UTERINE FIBROID SURGERY        Family History   Problem Relation Age of Onset    Breast cancer Sister     Prostate cancer Brother     Colon cancer Brother     Cancer Sister       Social History[1]   E-Cigarette/Vaping    E-Cigarette Use Never User       E-Cigarette/Vaping Substances    Nicotine No     THC No     CBD No     Flavoring No     Other No     Unknown No       I have reviewed and agree with the history as documented.     75-year-old female presenting with complaint of high blood pressure noticed at the dentist this morning.  Patient states that her blood pressure readings are usually normal at home but high when she is at a doctor's appointment.  She used to be on blood pressure medicine but was taken off due to orthostasis.  She reports intermittent headaches and some blurry vision when she woke up this morning which is resolved.  She is currently feeling well.         Review of Systems   All other systems reviewed and are negative.          Objective       ED Triage Vitals   Temperature Pulse Blood Pressure Respirations SpO2 Patient Position - Orthostatic VS   05/14/25 1147 05/14/25 1147 05/14/25 1147 05/14/25 1147 05/14/25 1147 05/14/25 1147   98 °F (36.7 °C) (!) 115 (!) 184/99 18 98 % Lying      Temp Source Heart Rate Source BP Location FiO2 (%) Pain Score    05/14/25 1147 05/14/25 1200 05/14/25 1147 -- 05/14/25 1150    Oral Monitor Left arm  No Pain      Vitals      Date and Time Temp Pulse SpO2 Resp BP Pain Score FACES Pain Rating User   05/14/25 1200 -- 108 97 % 18 176/91 No Pain -- FN   05/14/25 1150 -- -- -- -- -- No Pain -- FN   05/14/25 1147 98 °F (36.7 °C) 115 98 % 18 184/99 -- -- KD            Physical Exam  Constitutional:       General: She is not in acute distress.  HENT:      Mouth/Throat:      Mouth: Mucous membranes are moist.     Cardiovascular:      Rate and Rhythm: Normal rate.   Pulmonary:      Effort:  Pulmonary effort is normal.      Breath sounds: Normal breath sounds.   Abdominal:      Palpations: Abdomen is soft.     Musculoskeletal:      Right lower leg: No edema.      Left lower leg: No edema.     Skin:     General: Skin is warm and dry.     Neurological:      General: No focal deficit present.      Mental Status: She is alert and oriented to person, place, and time.         Results Reviewed       Procedure Component Value Units Date/Time    HS Troponin 0hr (reflex protocol) [[id number]]  (Normal) Collected: 05/14/25 1211    Lab Status: Final result Specimen: Blood from Arm, Right Updated: 05/14/25 1244     hs TnI 0hr 4 ng/L     HS Troponin I 2hr [[id number]]     Lab Status: No result Specimen: Blood     Comprehensive metabolic panel [[id number]] Collected: 05/14/25 1211    Lab Status: Final result Specimen: Blood from Arm, Right Updated: 05/14/25 1237     Sodium 139 mmol/L      Potassium 3.6 mmol/L      Chloride 103 mmol/L      CO2 27 mmol/L      ANION GAP 9 mmol/L      BUN 16 mg/dL      Creatinine 0.72 mg/dL      Glucose 114 mg/dL      Calcium 8.8 mg/dL      AST 20 U/L      ALT 14 U/L      Alkaline Phosphatase 88 U/L      Total Protein 7.1 g/dL      Albumin 4.3 g/dL      Total Bilirubin 0.62 mg/dL      eGFR 81 ml/min/1.73sq m     Narrative:      [employer] guidelines for Chronic Kidney Disease (CKD):     Stage 1 with normal or high GFR (GFR > 90 mL/min/1.73 square meters)    Stage 2 Mild CKD (GFR = 60-89 mL/min/1.73 square meters)    Stage 3A Moderate CKD (GFR = 45-59 mL/min/1.73 square meters)    Stage 3B Moderate CKD (GFR = 30-44 mL/min/1.73 square meters)    Stage 4 Severe CKD (GFR = 15-29 mL/min/1.73 square meters)    Stage 5 End Stage CKD (GFR <15 mL/min/1.73 square meters)  Note: GFR calculation is accurate only with a steady state creatinine    CBC and differential [[id number]]  (Abnormal) Collected: 05/14/25 1211    Lab Status: Final result Specimen: Blood from Arm, Right  Updated: 25 1219     WBC 7.88 Thousand/uL      RBC 4.52 Million/uL      Hemoglobin 13.4 g/dL      Hematocrit 40.6 %      MCV 90 fL      MCH 29.6 pg      MCHC 33.0 g/dL      RDW 12.0 %      MPV 10.5 fL      Platelets 276 Thousands/uL      nRBC 0 /100 WBCs      Segmented % 83 %      Immature Grans % 0 %      Lymphocytes % 11 %      Monocytes % 5 %      Eosinophils Relative 1 %      Basophils Relative 0 %      Absolute Neutrophils 6.56 Thousands/µL      Absolute Immature Grans 0.01 Thousand/uL      Absolute Lymphocytes 0.83 Thousands/µL      Absolute Monocytes 0.39 Thousand/µL      Eosinophils Absolute 0.07 Thousand/µL      Basophils Absolute 0.02 Thousands/µL             No orders to display       Procedures    ED Medication and Procedure Management   Prior to Admission Medications   Prescriptions Last Dose Informant Patient Reported? Taking?   Calcium Carbonate-Vitamin D (CALCIUM 500/D PO)  Self Yes No   Sig: Take by mouth   fluticasone (FLONASE) 50 mcg/act nasal spray  Self No No   Si spray into each nostril daily   multivitamin (THERAGRAN) TABS  Self Yes No   Sig: Take 1 tablet by mouth in the morning.   olopatadine (PATANOL) 0.1 % ophthalmic solution   No No   Sig: Administer 1 drop to both eyes 2 (two) times a day   omeprazole (PriLOSEC) 20 mg delayed release capsule   No No   Sig: Take 1 capsule (20 mg total) by mouth daily   Patient not taking: Reported on 2025      Facility-Administered Medications: None     Patient's Medications   Discharge Prescriptions    No medications on file     No discharge procedures on file.  ED SEPSIS DOCUMENTATION   Time reflects when diagnosis was documented in both MDM as applicable and the Disposition within this note       Time User Action Codes Description Comment    2025  1:15 PM Patito Alves Add [R03.0] Elevated blood pressure reading                    [1]   Social History  Tobacco Use    Smoking status: Never    Smokeless tobacco: Never   Vaping Use     Vaping status: Never Used   Substance Use Topics    Alcohol use: Never    Drug use: Never        Patito Alves MD  05/14/25 3070

## 2025-05-14 NOTE — DENTAL PROCEDURE DETAILS
Periodic exam only  Pt arrived at [facility] for first perio maint apt. Since Srp completed 10/2024. Pt did not want cleaning, complaining of sensitivity all over mouth and bleeding so much last apt. We explained to pt. She has periodontal disease and it is recommended she maintains every 3 mos. Pt. Was due 4 months ago.   Bp taken three times on two different arms due to high readings. Pt stated primary care dr had stopped HBP med. We recommended follow up with MD.   DR Valentine examined: no new decay but does still need trt  Pt satisfied with today's apt.    NV: pt requested numbing, need 50 mins perio maint   Bws due 7/22/25  Ext. Still needed #31 and crown #13 (recurrent decay present)  Referral sent in for MD

## 2025-05-14 NOTE — DISCHARGE INSTRUCTIONS
Your blood work results here in the ED were normal.  We recommend continuing to monitor your blood pressure at home and showing your primary care doctor your measurements at your next visit.

## 2025-05-15 LAB
ATRIAL RATE: 103 BPM
P AXIS: 78 DEGREES
PR INTERVAL: 162 MS
QRS AXIS: 73 DEGREES
QRSD INTERVAL: 90 MS
QT INTERVAL: 334 MS
QTC INTERVAL: 437 MS
T WAVE AXIS: 70 DEGREES
VENTRICULAR RATE: 103 BPM

## 2025-05-15 PROCEDURE — 93010 ELECTROCARDIOGRAM REPORT: CPT | Performed by: INTERNAL MEDICINE

## 2025-06-02 ENCOUNTER — OFFICE VISIT (OUTPATIENT)
Dept: FAMILY MEDICINE CLINIC | Facility: CLINIC | Age: 75
End: 2025-06-02
Payer: OTHER GOVERNMENT

## 2025-06-02 VITALS
HEIGHT: 59 IN | OXYGEN SATURATION: 97 % | DIASTOLIC BLOOD PRESSURE: 78 MMHG | WEIGHT: 112.2 LBS | HEART RATE: 93 BPM | SYSTOLIC BLOOD PRESSURE: 160 MMHG | BODY MASS INDEX: 22.62 KG/M2 | TEMPERATURE: 97.3 F

## 2025-06-02 DIAGNOSIS — Z13.820 SCREENING FOR OSTEOPOROSIS: ICD-10-CM

## 2025-06-02 DIAGNOSIS — Z00.00 ANNUAL PHYSICAL EXAM: Primary | ICD-10-CM

## 2025-06-02 DIAGNOSIS — I10 PRIMARY HYPERTENSION: ICD-10-CM

## 2025-06-02 DIAGNOSIS — Z11.59 ENCOUNTER FOR HCV SCREENING TEST FOR LOW RISK PATIENT: ICD-10-CM

## 2025-06-02 DIAGNOSIS — Z13.220 SCREENING, LIPID: ICD-10-CM

## 2025-06-02 PROCEDURE — 99397 PER PM REEVAL EST PAT 65+ YR: CPT | Performed by: FAMILY MEDICINE

## 2025-06-02 NOTE — ASSESSMENT & PLAN NOTE
Orders:    TSH, 3rd generation with Free T4 reflex; Future    CBC and differential; Future    Comprehensive metabolic panel; Future    
no cyanosis/no pedal edema

## 2025-06-02 NOTE — PROGRESS NOTES
Adult Annual Physical  Name: Lacey Shields      : 1950      MRN: [id number]  Encounter Provider: Seda Frost MD  Encounter Date: 2025   Encounter department: [facility]    :  Assessment & Plan  Primary hypertension    Orders:    TSH, 3rd generation with Free T4 reflex; Future    CBC and differential; Future    Comprehensive metabolic panel; Future    Screening, lipid    Orders:    Lipid Panel with Direct LDL reflex; Future    Screening for osteoporosis    Orders:    DXA bone density spine hip and pelvis; Future    Encounter for HCV screening test for low risk patient    Orders:    Hepatitis C antibody; Future    Annual physical exam         In assessment, Lacey Shields is a 74 y/o female patient here today for annual physical. She has primary hypertension not on medications but her blood pressures are in a range that is largely normal for her age per review of home BP log  and very high readings of 160+ seem to occur when she goes to hospital or medical visits, but she is able to get back to normal range right after. We will do TSH, CBC, CMP to monitor but for now, she can continue without antihypertensive medication due to her age and risk of hypotension and risk of falls. She will get lipid panel and DEXA for osteoporosis. We also discussed getting Hep C screening as well as getting immunizations of Shingrix, [id number], and Tdap when she has the opportunity, declines today.    Preventive Screenings:  - Diabetes Screening: screening up-to-date  - Cholesterol Screening: orders placed   - Hepatitis C screening: risks/benefits discussed   - HIV screening: risks/benefits discussed   - Cervical cancer screening: screening not indicated   - Breast cancer screening: screening not indicated   - Colon cancer screening: screening not indicated   - Lung cancer screening: screening not indicated   - Osteoporosis screening: orders placed     Immunizations:  - Immunizations due: Prevnar 20,  Tdap and Zoster (Shingrix)    Counseling/Anticipatory Guidance:  - Alcohol: discussed moderation in alcohol intake and recommendations for healthy alcohol use.   - Drug use: discussed harms of illicit drug use and how it can negatively impact mental/physical health.   - Tobacco use: discussed harms of tobacco use and management options for quitting.   - Dental health: discussed importance of regular tooth brushing, flossing, and dental visits.   - Sexual health: discussed sexually transmitted diseases, partner selection, use of condoms, avoidance of unintended pregnancy, and contraceptive alternatives.   - Diet: discussed recommendations for a healthy/well-balanced diet.   - Exercise: the importance of regular exercise/physical activity was discussed. Recommend exercise 3-5 times per week for at least 30 minutes.   - Injury prevention: discussed safety/seat belts, safety helmets, smoke detectors, carbon monoxide detectors, and smoking near bedding or upholstery.       Depression Screening and Follow-up Plan: Patient was screened for depression during today's encounter. They screened negative with a PHQ-2 score of 1.          History of Present Illness     HPI  Lacey Shields is a 74 y/o female patient here for an annual physical today. Her only concern was elevated blood pressure where she had a previous dental procedure that had to be cancelled when she had a systolic BP over 200. She says she tends to experience high BP from being emotional or hearing bad news, such as hearing news about her family members or neighbors passing away. She comes in today with a record of blood pressures that she took with her home BP cuff, but does note she tends to take when she feels like it is high. The BP ranged from 130s-150s/80s-90s, with most SBP between 140-150. Otherwise she feels good, is getting daily exercise most days with aerobics, dancing, and yoga. She does not smoke and drinks occasionally. She takes calcium and  "vitamin D regularly and is on no antihypertensive medications as she previously had orthostatism due to BP below goal for age, with BP remaining at goal at home without medications.    Adult Annual Physical:  Patient presents for annual physical.     Diet and Physical Activity:  - Diet/Nutrition: well balanced diet and consuming 3-5 servings of fruits/vegetables daily. Tend to snack, 2-3 meals a day, does not eat a lot during meal  - Exercise: moderate cardiovascular exercise and 30-60 minutes on average.    Depression Screening:  - PHQ-2 Score: 1    General Health:  - Sleep: 7-8 hours of sleep on average.  - Hearing: normal hearing right ear and normal hearing left ear.  - Vision: no vision problems.  - Dental: regular dental visits.    /GYN Health:  - Follows with GYN: no.   - Menopause: postmenopausal.   - History of STDs: no  - Contraception: hysterectomy.      Review of Systems   Constitutional:  Negative for chills, fatigue and fever.   HENT:  Negative for dental problem, ear pain, hearing loss and sore throat.    Eyes:  Negative for pain.   Respiratory:  Negative for cough, chest tightness and shortness of breath.    Cardiovascular:  Negative for chest pain and leg swelling.   Gastrointestinal:  Negative for abdominal pain, constipation, diarrhea, nausea and vomiting.   Musculoskeletal:  Negative for arthralgias, back pain and neck pain.   Skin:  Negative for rash and wound.   Neurological:  Negative for dizziness, weakness and headaches.   Psychiatric/Behavioral:  Negative for agitation and dysphoric mood.          Objective   /78 (BP Location: Left arm, Patient Position: Sitting, Cuff Size: Standard)   Pulse 93   Temp (!) 97.3 °F (36.3 °C) (Temporal)   Ht 4' 11.25\" (1.505 m)   Wt 50.9 kg (112 lb 3.2 oz)   SpO2 97%   BMI 22.47 kg/m²     Physical Exam  Constitutional:       Appearance: Normal appearance. She is normal weight.   HENT:      Head: Normocephalic and atraumatic.      Right Ear: " Tympanic membrane and ear canal normal.      Left Ear: Tympanic membrane and ear canal normal.      Mouth/Throat:      Mouth: Mucous membranes are moist.      Pharynx: Oropharynx is clear.     Eyes:      Conjunctiva/sclera: Conjunctivae normal.      Pupils: Pupils are equal, round, and reactive to light.       Cardiovascular:      Rate and Rhythm: Normal rate and regular rhythm.      Heart sounds: Normal heart sounds.   Pulmonary:      Effort: Pulmonary effort is normal.      Breath sounds: Normal breath sounds. No wheezing.   Abdominal:      General: Abdomen is flat. There is no distension.      Palpations: Abdomen is soft.      Tenderness: There is no abdominal tenderness.     Skin:     General: Skin is warm and dry.     Neurological:      General: No focal deficit present.      Mental Status: She is alert and oriented to person, place, and time.     Psychiatric:         Mood and Affect: Mood normal.         Behavior: Behavior normal.

## 2025-06-02 NOTE — PATIENT INSTRUCTIONS
"Patient Education     Recombinant Zoster (Shingles) Vaccine CDC Vaccine Information Statement (VIS)   About this topic           Patient Education     Vacuna Tdap   Conceptos Básicos   Redactado por los médicos y editores de UpToDate   ¿Qué es la vacuna Tdap? -- La vacuna Tdap ayuda a prevenir twin enfermedades distintas: tétano, difteria y tosferina. Las twin vacunas se aplican juntas en law inyección.  Las twin enfermedades pueden ser graves e incluso mortales. La difteria puede producir law cubierta gruesa en la parte de atrás de la garganta que puede causar problemas respiratorios. El tétano es law infección grave que causa rigidez muscular y espasmos. La tosferina puede causar law tos grave. Otro nombre para la tosferina es \"tos convulsiva\".  ¿Por qué jessica recibir la vacuna Tdap? -- La vacuna Tdap puede ayudar a evitar que usted tenga difteria, tétano o tosferina. Si se enferma, alf está vacunado, puede evitar law enfermedad más grave, y además evitar que quienes lo rodean también se enfermen.  ¿Quién debe recibir la vacuna Tdap? -- Las siguientes personas deben recibir la vacuna Tdap:   Niños de entre 7 y 18 años de edad - Muchos niños reciben la vacuna contra la difteria, el tétano y la tosferina antes de los 7 años. En loyda gerald, los médicos recomiendan un “refuerzo” de la vacuna Tdap, generalmente alrededor de los 11 o 12 años. El refuerzo le recuerda al cuerpo cómo prevenir la infección a lo ender del tiempo. Si tucker hijo no recibió la vacuna antes, puede recibir law vacuna Tdap cuando tucker médico se lo recomiende.   Adultos mayores de 19 años de edad que nunca hayan recibido law vacuna Tdap - Esta vacuna es especialmente importante para los adultos mayores de 65 años que pasan tiempo con niños menores de 1 año. Estos adultos son, entre otros, los abuelos, las abuelas, las personas que trabajan cuidando niños, los médicos, los enfermeros y otros trabajadores de atención médica.  Es seguro recibir la vacuna " Tdap alonzo el embarazo. Todas las personas embarazadas deben recibirla después de las 20 semanas de embarazo, incluso si ya recibieron esta misma vacuna o la vacuna contra la tosferina anteriormente.  Si no sabe si recibió la vacuna Tdap, es probable que el médico se la aplique. Esta vacuna es importante y protege tucker jovani y la de los demás, en especial la de los bebés. La tosferina puede ser muy grave para los bebés e incluso causar la muerte.  Después de recibir la vacuna Tdap, los adultos deben recibir vacunas contra la difteria y el tétano cada 10 años. Estas también se colocan combinadas en law inyección.  ¿Qué efectos secundarios puede causar la vacuna Tdap? -- La vacuna Tdap puede causar algunos efectos secundarios. Si lo hace, pueden ser, entre otros:   Enrojecimiento, inflamación o dolor en el lugar donde se colocó la inyección   Dolor de elvie   Cansancio   Fiebre  Al igual que todas las vacunas, la vacuna Tdap a veces puede causar efectos secundarios más graves, pelon reacciones alérgicas graves. Sin embargo, los efectos secundarios graves no suceden con frecuencia. Si usted tuvo alguna vez law reacción alérgica grave al látex, dígaselo a tucker médico o enfermero. Algunas vacunas Tdap contienen látex, alf otras no.  Todos los artículos se actualizan a medida que se descubre nueva evidencia y culmina nuestro proceso de evaluación por homólogos   Ava artículo se recuperó de UpToDate el: Feb 26, 2024.  Artículo 30667 Versión 11.0.es-419.1  Release: 32.2.4 - C32.56  © 2024 [employer] Todos los derechos reservados.  tabla 1: Programa de vacunación recomendado para niños de 7 a 18 años (EE. UU.)  Edad  Vacuna  Dosis  Notas    En general, los médicos recomiendan las siguientes vacunas:    7 a 18 años Enfermedad por coronavirus 2019 (COVID-19) 2 dosis Se recomienda law dosis de refuerzo para los niños mayores de 5 años. Deben recibirla al menos 5 meses después de las vacunas iniciales.   7 a 18 años  Influenza (gripe) 1 dosis por año Los niños reciben 1 dosis cada otoño. Los niños menores de 9 años podrían necesitar más de law dosis.   11 a 12 años Tétano, difteria, tosferina (Tdap) 1 dosis Se suele aplicar entre las edades de 11 y 12 años, alf se puede aplicar más adelante, a cualquier edad.    Virus del papiloma humano (VPH) 2 dosis Se suele aplicar entre las edades de 11 y 12 años, alf se puede aplicar desde los 9 hasta los 26 años. Las personas menores de 15 años generalmente reciben 2 dosis en el transcurso de 6 meses. Las personas de 15 años o más reciben 3 dosis en el transcurso de 6 meses.   11 a 18 años Meningococo 2 dosis La primera dosis se suele aplicar entre los 11 y los 12 años. Generalmente se aplica law dosis de refuerzo a los 16 años.   Las siguientes vacunas se pueden aplicar a niños con ciertos padecimientos    7 a 10 años Meningococo 1 dosis Se aplica a los niños antes de la edad recomendada si tienen ciertos padecimientos de jovani, o viven en ciertos lugares o viajan a estos.   9 a 10 años Virus del papiloma humano (VPH) 2 o 3 dosis Se aplica a los niños antes de la edad recomendada si tienen ciertos padecimientos de jovani.   7 a 18 años Haemophilus influenza tipo B (HiB) 1 dosis Se aplica a los niños que tienen ciertos padecimientos de jovani.    Neumococo (PPSV) 1 o 2 dosis Se aplica a los niños que tienen ciertos padecimientos de jovani.    Hepatitis A (HepA) 2 dosis Algunos niños a los que nunca se aplicó esta vacuna podrían necesitarla.   NOTA: Si tucker hijo no recibe todas las vacunas programadas entre los 0 y los 6 años de edad, es posible que deba recibirlas entre los 7 y los 18.  Gráfico 53277 Versión 21.0  Exención de responsabilidad y uso de la información del consumidor   Descargo de responsabilidad: esta información generalizada es un resumen limitado de información sobre el diagnóstico, el tratamiento y/o los medicamentos. No pretende ser exhaustiva y se debe utilizar pelon  herramienta para ayudar al usuario a comprender y/o evaluar las posibles opciones de diagnóstico y tratamiento. No incluye toda la información sobre afecciones, tratamientos, medicamentos, efectos secundarios o riesgos puedan ser aplicables a un paciente específico. No tiene el propósito de servir pelon recomendación médica ni de sustituir la recomendación médica, el diagnóstico o el tratamiento de un profesional de atención médica que se base en el examen y la evaluación de anne profesional de la jovani respecto a las circunstancias específicas y únicas del paciente. Los pacientes deben hablar con un profesional de atención médica para obtener información completa sobre tucker jovani, cuestiones médicas y opciones de tratamiento, incluidos los riesgos o los beneficios relacionados con el uso de medicamentos. Esta información no certifica que los tratamientos o medicamentos herson seguros, eficaces o estén aprobados para tratar a un paciente específico. [employer] y jimmy afiliados renuncian a cualquier garantía o responsabilidad relacionada con esta información o el uso de la misma.El uso de esta información está sujeto a las Condiciones de uso, disponibles en https://www.Kaesuer.com/en/know/clinical-effectiveness-terms. 2024© [employer] y jimmy afiliados y/o licenciantes. Todos los derechos reservados.  Copyright   © 2024 [employer] Todos los derechos reservados.      Patient Education     Pneumococcal Conjugate Vaccine (20-Valent) (noo steffi ALTAGRACIA Rodgers vak SEEN, NERYN margy vay lent)   Nombres comerciales: [place]. Prevnar 20   Nombres comerciales: [place] Prevnar 20   ¿Para qué se utiliza anne medicamento?   Anne medicamento se utiliza para evitar infecciones causadas por Streptococcus pneumoniae.  ¿Qué necesito decirle a mi médico ANTES de keith anne medicamento?   Si es alérgico a anne medicamento, a algún componente de anne medicamento, o a otros medicamentos, alimentos o sustancias. Informe a tucker médico  acerca de esta alergia y qué síntomas ha presentado.  Ava medicamento puede interactuar con otros medicamentos o trastornos.  Informe a tucker médico y farmacéutico acerca de todos los medicamentos que tome (herson estos recetados o de venta dyllan, productos naturales, vitaminas) y los trastornos que tenga. Debe verificar que sea seguro para usted keith ava medicamento junto con todos jimmy otros medicamentos y trastornos. No empiece, detenga ni modifique la dosis de ningún medicamento sin consultar antes al médico.  ¿Qué jessica saber o hacer mientras belgica ava medicamento?   Avise a todos jimmy proveedores de atención médica que kerry ava medicamento. [place] incluye a los médicos, enfermeras, farmacéuticos y dentistas.  [place] todas las vacunas, es posible que esta vacuna no proteja por completo a todas las personas que la reciban. Si tiene alguna pregunta, hable con el médico.  Si tiene un sistema inmunitario débil o si consume drogas que debilitan tucker sistema inmunitario, consulte con tucker médico. Es posible que esta vacuna no funcione.  Informe a tucker médico si está embarazada, tiene intención de embarazarse o está amamantando. Tendrá que hablar acerca de los riesgos y beneficios para usted y el bebé.  ¿Cuáles son los efectos secundarios por los que jessica llamar a mi médico de inmediato?   ADVERTENCIA/PRECAUCIÓN: A pesar de que es muy poco frecuente, algunas personas pueden sufrir efectos secundarios muy graves, que causen incluso la muerte, al keith un medicamento. Si presenta alguno de los siguientes signos o síntomas que puedan estar relacionados con un efecto secundario muy grave, infórmelo a tucker médico o busque asistencia médica de inmediato:  Para todos los pacientes que javier ava medicamento:   Signos de reacción alérgica tales pelon sarpullido; urticaria; picazón; piel enrojecida, hinchada, con ampollas o descamada, con o sin fiebre; sibilancia; opresión en el pecho o la garganta; problemas para respirar, tragar o hablar;  ronquera inusual; o hinchazón de la boca, el danielle, los labios, la lengua o la garganta.  Niños:   [place] ava medicamento con precaución si tucker hijo o hija es un lactante nacido prematuramente. Existe la posibilidad de que terri vez tucker hijo tenga más efectos secundarios.  ¿Qué otros efectos secundarios tiene ava medicamento?   Todos los medicamentos pueden tener efectos secundarios. Sin embargo, muchas personas no tienen ningún efecto secundario o tienen solamente efectos secundarios menores. Llame a tucker médico o busque asistencia médica si le molesta alguno de estos efectos secundarios o no desaparece:  Para todos los pacientes que javier ava medicamento:   Dolor, enrojecimiento o hinchazón donde se aplicó la inyección.  Se siente muy cansado o débil.  Dolor de elvie.  Dolor en los músculos o las articulaciones.  Niños:   Sensación de irritabilidad.  Somnolencia.  Disminución del apetito.  Fiebre leve.  Estos no son todos los efectos secundarios que podrían ocurrir. Si tiene preguntas acerca de los efectos secundarios, llame al médico. Llame al médico para que lo aconseje acerca de los efectos secundarios.  Puede informar los efectos secundarios al organismo de jovani de tucker país.  Informe los efectos secundarios al Sistema de notificación de eventos adversos de vacunas (VAERS) de la FDA/CDC en https://vaers.hhs.gov/reportevent.html o llame al 1-437-092-1768.  ¿Cómo se kerry mejor ava medicamento?   [place] ava medicamento según las indicaciones de tucker médico. Neli toda la información que se le brinde. Siga todas las instrucciones con atención.  Ava medicamento se administra en forma de inyección intramuscular.  Los niños pueden necesitar más de law dosis de esta vacuna. Si el paciente es un sharri, asegúrese de saber cuántas dosis ha tomado y cuántas dosis necesita.  ¿Qué jessica hacer si no belgica law dosis?   Llame al médico para obtener instrucciones.  ¿Cómo almaceno o descarto ava medicamento?   Si necesita almacenar ava  medicamento en casa, hable con tucker médico, enfermera o farmacéutico sobre cómo hacerlo.  Datos generales sobre el medicamento   Si jimmy síntomas o trastornos no mejoran o si empeoran, llame al médico.  No comparta tucker medicamento con otras personas ni tome el medicamento de ninguna otra persona.  Guarde los medicamentos en un lugar seguro. Mantenga todo medicamento fuera del alcance de los niños y las mascotas.  Deseche los medicamentos sin usar o que hayan expirado. No los tire por el retrete ni los vierta al desagüe a menos que así se lo indiquen. Consulte con el farmacéutico si tiene preguntas sobre la mejor manera de desechar medicamentos. Pueden existir programas de devolución de medicamentos en tucker área.  Algunos medicamentos pueden tener otro folleto informativo para el paciente. Si tiene alguna pregunta sobre anne medicamento, hable con tucker médico, enfermera, farmacéutico u otro proveedor de atención médica.  Algunos medicamentos pueden tener otro folleto informativo para el paciente. Consulte al farmacéutico. Si tiene alguna pregunta sobre anne medicamento, hable con tucker médico, enfermera, farmacéutico u otro proveedor de atención médica.  Si jose ramon que ha habido law sobredosis, llame al centro de toxicología local o busque atención médica de inmediato. Prepárese para responder qué se ingirió, qué cantidad y cuándo.  Información adicional   Los Centros de Control y Prevención de Enfermedades (CDC, por tucker siglas en inglés) producen las hojas informativas sobre vacunas (VIS, por tucker siglas en inglés). Cada VIS ofrece información pertinente para educar adecuadamente al adulto que recibe la vacuna, o en el gerald de menores de edad, a jimmy padres o representantes legales acerca de los riesgos y ventajas de cada vacuna. Antes de que el médico vacune a un adulto o sharri, el proveedor debe entregar law copia de la VIS correspondiente según lo requiere la Fel[employer] de Lesiones Causadas por Vacunas Infantiles. También hay  "versiones en distintos idiomas.  https://www.cdc.gov/vaccines/hcp/vis/vis-statements/pcv.html   Uso de la información por el consumidor y exención de responsabilidad   Esta información general es un resumen limitado de la información sobre el diagnóstico, el tratamiento y/o la medicación. No pretende ser exhaustivo y debe utilizarse pelon law herramienta para ayudar al usuario a comprender y/o evaluar las posibles opciones de diagnóstico y tratamiento. NO incluye toda la información sobre las enfermedades, los tratamientos, los medicamentos, los efectos secundarios o los riesgos que pueden aplicarse a un paciente específico. No pretende ser un consejo médico ni un sustituto del consejo médico, el diagnóstico o el tratamiento de un proveedor de atención médica basado en el examen y la evaluación del proveedor de atención médica de las circunstancias específicas y únicas de un paciente. Los pacientes deben hablar con un proveedor de atención médica para obtener información completa sobre tucker jovani, preguntas médicas y opciones de tratamiento, incluidos los riesgos o beneficios relacionados con el uso de medicamentos. Esta información no respalda ningún tratamiento o medicamento pelon seguro, eficaz o aprobado para tratar a un paciente específico. [employer] y jimmy afiliados renuncian a cualquier garantía o responsabilidad relacionada con esta información o con el uso que se soraida de esta. El uso de esta información se rige por las Condiciones de uso, disponibles en https://www.ProprietÃ¡rioDiretoer.com/en/know/clinical-effectiveness-terms.  Última fecha de revisión   2023-05-12  Derechos de autor   © 2024 [facility] y jimmy licenciantes y/o afiliados. Todos los derechos reservados.    Patient Education     Routine physical for adults   The Basics   Written by the doctors and editors at [facility]   What is a physical? -- A physical is a routine visit, or \"check-up,\" with your doctor. You might also hear it called a \"wellness " "visit\" or \"preventive visit.\"  During each visit, the doctor will:   Ask about your physical and mental health   Ask about your habits, behaviors, and lifestyle   Do an exam   Give you vaccines if needed   Talk to you about any medicines you take   Give advice about your health   Answer your questions  Getting regular check-ups is an important part of taking care of your health. It can help your doctor find and treat any problems you have. But it's also important for preventing health problems.  A routine physical is different from a \"sick visit.\" A sick visit is when you see a doctor because of a health concern or problem. Since physicals are scheduled ahead of time, you can think about what you want to ask the doctor.  How often should I get a physical? -- It depends on your age and health. In general, for people age 21 years and older:   If you are younger than 50 years, you might be able to get a physical every 3 years.   If you are 50 years or older, your doctor might recommend a physical every year.  If you have an ongoing health condition, like diabetes or high blood pressure, your doctor will probably want to see you more often.  What happens during a physical? -- In general, each visit will include:   Physical exam - The doctor or nurse will check your height, weight, heart rate, and blood pressure. They will also look at your eyes and ears. They will ask about how you are feeling and whether you have any symptoms that bother you.   Medicines - It's a good idea to bring a list of all the medicines you take to each doctor visit. Your doctor will talk to you about your medicines and answer any questions. Tell them if you are having any side effects that bother you. You should also tell them if you are having trouble paying for any of your medicines.   Habits and behaviors - This includes:   Your diet   Your exercise habits   Whether you smoke, drink alcohol, or use drugs   Whether you are sexually " "active   Whether you feel safe at home  Your doctor will talk to you about things you can do to improve your health and lower your risk of health problems. They will also offer help and support. For example, if you want to quit smoking, they can give you advice and might prescribe medicines. If you want to improve your diet or get more physical activity, they can help you with this, too.   Lab tests, if needed - The tests you get will depend on your age and situation. For example, your doctor might want to check your:   Cholesterol   Blood sugar   Iron level   Vaccines - The recommended vaccines will depend on your age, health, and what vaccines you already had. Vaccines are very important because they can prevent certain serious or deadly infections.   Discussion of screening - \"Screening\" means checking for diseases or other health problems before they cause symptoms. Your doctor can recommend screening based on your age, risk, and preferences. This might include tests to check for:   Cancer, such as breast, prostate, cervical, ovarian, colorectal, prostate, lung, or skin cancer   Sexually transmitted infections, such as chlamydia and gonorrhea   Mental health conditions like depression and anxiety  Your doctor will talk to you about the different types of screening tests. They can help you decide which screenings to have. They can also explain what the results might mean.   Answering questions - The physical is a good time to ask the doctor or nurse questions about your health. If needed, they can refer you to other doctors or specialists, too.  Adults older than 65 years often need other care, too. As you get older, your doctor will talk to you about:   How to prevent falling at home   Hearing or vision tests   Memory testing   How to take your medicines safely   Making sure that you have the help and support you need at home  All topics are updated as new evidence becomes available and our peer review process " is complete.  This topic retrieved from [employer] on: May 02, 2024.  Topic 614460 Version 1.0  Release: 32.4.3 - C32.122  © 2024 UpToDate, Inc. and/or its affiliates. All rights reserved.  Consumer Information Use and Disclaimer   Disclaimer: This generalized information is a limited summary of diagnosis, treatment, and/or medication information. It is not meant to be comprehensive and should be used as a tool to help the user understand and/or assess potential diagnostic and treatment options. It does NOT include all information about conditions, treatments, medications, side effects, or risks that may apply to a specific patient. It is not intended to be medical advice or a substitute for the medical advice, diagnosis, or treatment of a health care provider based on the health care provider's examination and assessment of a patient's specific and unique circumstances. Patients must speak with a health care provider for complete information about their health, medical questions, and treatment options, including any risks or benefits regarding use of medications. This information does not endorse any treatments or medications as safe, effective, or approved for treating a specific patient. UpToDate, Inc. and its affiliates disclaim any warranty or liability relating to this information or the use thereof.The use of this information is governed by the Terms of Use, available at https://www.woltersClipper Windpoweruwer.com/en/know/clinical-effectiveness-terms. 2024© UpToDate, Inc. and its affiliates and/or licensors. All rights reserved.  Copyright   © 2024 UpToDate, Inc. and/or its affiliates. All rights reserved.

## 2025-06-18 ENCOUNTER — OFFICE VISIT (OUTPATIENT)
Dept: DENTISTRY | Facility: CLINIC | Age: 75
End: 2025-06-18

## 2025-06-18 VITALS — HEART RATE: 112 BPM | DIASTOLIC BLOOD PRESSURE: 75 MMHG | SYSTOLIC BLOOD PRESSURE: 164 MMHG

## 2025-06-18 DIAGNOSIS — K05.6 PERIODONTAL DISEASE: Primary | ICD-10-CM

## 2025-06-18 PROCEDURE — D0140 LIMITED ORAL EVALUATION - PROBLEM FOCUSED: HCPCS

## 2025-06-18 PROCEDURE — D0220 INTRAORAL - PERIAPICAL FIRST RADIOGRAPHIC IMAGE: HCPCS

## 2025-06-18 RX ORDER — CHLORHEXIDINE GLUCONATE ORAL RINSE 1.2 MG/ML
15 SOLUTION DENTAL 2 TIMES DAILY
Qty: 120 ML | Refills: 0 | Status: SHIPPED | OUTPATIENT
Start: 2025-06-18

## 2025-06-19 ENCOUNTER — OFFICE VISIT (OUTPATIENT)
Dept: DENTISTRY | Facility: CLINIC | Age: 75
End: 2025-06-19

## 2025-06-19 VITALS — SYSTOLIC BLOOD PRESSURE: 159 MMHG | DIASTOLIC BLOOD PRESSURE: 91 MMHG

## 2025-06-19 DIAGNOSIS — K05.4 PERIODONTOSIS: Primary | ICD-10-CM

## 2025-06-19 PROCEDURE — D1330 ORAL HYGIENE INSTRUCTIONS: HCPCS

## 2025-06-19 PROCEDURE — D4910 PERIODONTAL MAINTENANCE: HCPCS

## 2025-06-19 NOTE — PROGRESS NOTES
Procedure Details   - ORAL HYGIENE INSTRUCTIONS   proper brushing technique with patient mirror. Recommended power tooth brush, 2 minutes of brushing BID, and mouthrinse. Plan to re-eval OH at Next Visit and finalize tx plan at that time. Pt left satisfied and ambulatory.   - PERIODONTAL MAINTENANCE       3 Month Periodontal Maintenance , Periocharting     REVIEWED MED HX: meds, allergies, health changes reviewed in EPIC  CHIEF CONCERN:  #30 she knows needs ext. Bothering her    ASA CLASS:  ASA 2 - Patient with mild systemic disease with no functional limitations  PLAQUE:  heavy  CALCULUS:  Moderate  BLEEDING:  heavy  STAIN: Light  PERIO: probing 4+mm, bop    Hygiene Procedures: Scaled, Polished, Flossed    Oral Hygiene Instruction: Brushing minimum 2x daily for 2 minutes, daily flossing and Listerine-electric tb strongly recommended    Visual and Tactile Intraoral/ Extraoral evaluation: Oral and Oropharyngeal cancer evaluation. No findings     REFERRALS: None            Next Recall: 3 month perio maintenance 9/2025  Periodic exam 12/25  Bws due 7/25  [place] Ext #31  #13 crown    Last BWX:   Last Panorex/FMX :

## 2025-06-19 NOTE — PROGRESS NOTES
"Procedure Details  29  - INTRAORAL - PERIAPICAL FIRST RADIOGRAPHIC IMAGE   - LIMITED ORAL EVALUATION - PROBLEM FOCUSED    Limited Exam    Lacey Shields 75 y.o. female presents with self to [place] for Limited exam  PMH reviewed, no changes, ASA II. Significant medical history: Reviewed with pt. Significant allergies: NKDA. Significant medications: Reviewed with pt.    Chief complaint:  \"I am having pain on the lower right. The gum bleeds a lot and it is painful to floss.\"    Consent:  Discussed that limited exam focuses on problem area, and same day tx is not guaranteed.  Patient explained to if they wish to have anything else evaluated, they need to return to the practice at which they are a patient of record or schedule a comprehensive exam afterwards.  Patient understands and consent was given by self via verbal consent.    Subjective history:    Onset: a few weeks ago.   Provocation: Touching it.   Quality: Dull.   Region: Patient points to tooth #29 distal papilla.   Severity: 2/10.   Timing: only when provoked.    Objective clinical findings:   Oral cancer screening: normal.   Extraoral exam: no remarkable findings.  Intraoral exam: Interproximal papilla of #29 and #30 swollen with localized perio pocket of 5 mm. Generalized perio probings of 4s. Generalized bleeding and erythema. Pt had SCRPs in October 2024, but has not followed up on maintence. Reviewed OHI and importance of follow-ups every 4 months and pt understood next steps and diagnosis. Pt also shown and told about open margin on distal of #29 crown. Will continue to monitor for now, but pt understands crown should be redone at some point to prevent leakage of decay.      Radiographs: Single PA - #29.     Pulp testing:  #29 Cold:Previously treated endodontically; Percussion: Normal; Palpation: Normal.    Assessment:  Perio pocket #29 distal    Plan:   Perio maintenance    Referral(s): None needed.  Rx: chlorohexidine.  Comprehensive care " disposition: Pt of record.  Patient dismissed ambulatory and alert.    NV: Perio Maintenance.    Attending: Dr. Castellon was present in clinic.

## 2025-06-19 NOTE — DENTAL PROCEDURE DETAILS
proper brushing technique with patient mirror. Recommended power tooth brush, 2 minutes of brushing BID, and mouthrinse. Plan to re-eval OH at Next Visit and finalize tx plan at that time. Pt left satisfied and ambulatory.

## 2025-06-19 NOTE — DENTAL PROCEDURE DETAILS
3 Month Periodontal Maintenance , Periocharting     REVIEWED MED HX: meds, allergies, health changes reviewed in EPIC  CHIEF CONCERN:  #30 she knows needs ext. Bothering her    ASA CLASS:  ASA 2 - Patient with mild systemic disease with no functional limitations  PLAQUE:  heavy  CALCULUS:  Moderate  BLEEDING:  heavy  STAIN: Light  PERIO: probing 4+mm, bop    Hygiene Procedures: Scaled, Polished, Flossed    Oral Hygiene Instruction: Brushing minimum 2x daily for 2 minutes, daily flossing and Listerine-electric tb strongly recommended    Visual and Tactile Intraoral/ Extraoral evaluation: Oral and Oropharyngeal cancer evaluation. No findings     REFERRALS: None            Next Recall: 3 month perio maintenance 9/2025  Periodic exam 12/25  Bws due 7/25  NV Ext #31  #13 crown    Last BWX:   Last Panorex/FMX :

## 2025-08-08 ENCOUNTER — OFFICE VISIT (OUTPATIENT)
Dept: DENTISTRY | Facility: CLINIC | Age: 75
End: 2025-08-08

## 2025-08-08 VITALS — SYSTOLIC BLOOD PRESSURE: 172 MMHG | HEART RATE: 94 BPM | DIASTOLIC BLOOD PRESSURE: 95 MMHG

## 2025-08-08 DIAGNOSIS — K05.30 PERIODONTITIS: Primary | ICD-10-CM

## 2025-08-08 PROCEDURE — D7140 EXTRACTION, ERUPTED TOOTH OR EXPOSED ROOT (ELEVATION AND/OR FORCEPS REMOVAL): HCPCS
